# Patient Record
Sex: FEMALE | Race: BLACK OR AFRICAN AMERICAN | NOT HISPANIC OR LATINO | Employment: FULL TIME | URBAN - METROPOLITAN AREA
[De-identification: names, ages, dates, MRNs, and addresses within clinical notes are randomized per-mention and may not be internally consistent; named-entity substitution may affect disease eponyms.]

---

## 2018-07-25 ENCOUNTER — HOSPITAL ENCOUNTER (EMERGENCY)
Facility: HOSPITAL | Age: 58
Discharge: HOME | End: 2018-07-25
Attending: EMERGENCY MEDICINE | Admitting: EMERGENCY MEDICINE
Payer: COMMERCIAL

## 2018-07-25 VITALS
TEMPERATURE: 98.1 F | OXYGEN SATURATION: 98 % | RESPIRATION RATE: 16 BRPM | DIASTOLIC BLOOD PRESSURE: 65 MMHG | SYSTOLIC BLOOD PRESSURE: 145 MMHG | HEART RATE: 85 BPM

## 2018-07-25 DIAGNOSIS — R73.9 HYPERGLYCEMIA: Primary | ICD-10-CM

## 2018-07-25 LAB
ALBUMIN SERPL BCP-MCNC: 3.4 G/DL (ref 3.5–5)
ALP SERPL-CCNC: 170 U/L (ref 46–116)
ALT SERPL W P-5'-P-CCNC: 22 U/L (ref 12–78)
ANION GAP SERPL CALCULATED.3IONS-SCNC: 9 MMOL/L (ref 4–13)
AST SERPL W P-5'-P-CCNC: 14 U/L (ref 5–45)
ATRIAL RATE: 83 BPM
BASOPHILS # BLD AUTO: 0.05 THOUSANDS/ΜL (ref 0–0.1)
BASOPHILS NFR BLD AUTO: 1 % (ref 0–1)
BILIRUB SERPL-MCNC: 0.3 MG/DL (ref 0.2–1)
BILIRUB UR QL STRIP: NEGATIVE
BUN SERPL-MCNC: 12 MG/DL (ref 5–25)
CALCIUM SERPL-MCNC: 9.3 MG/DL (ref 8.3–10.1)
CHLORIDE SERPL-SCNC: 95 MMOL/L (ref 100–108)
CLARITY UR: CLEAR
CO2 SERPL-SCNC: 27 MMOL/L (ref 21–32)
COLOR UR: YELLOW
CREAT SERPL-MCNC: 1.23 MG/DL (ref 0.6–1.3)
EOSINOPHIL # BLD AUTO: 0.17 THOUSAND/ΜL (ref 0–0.61)
EOSINOPHIL NFR BLD AUTO: 2 % (ref 0–6)
ERYTHROCYTE [DISTWIDTH] IN BLOOD BY AUTOMATED COUNT: 12.9 % (ref 11.6–15.1)
GFR SERPL CREATININE-BSD FRML MDRD: 57 ML/MIN/1.73SQ M
GLUCOSE SERPL-MCNC: 146 MG/DL (ref 65–140)
GLUCOSE SERPL-MCNC: 447 MG/DL (ref 65–140)
GLUCOSE UR STRIP-MCNC: ABNORMAL MG/DL
HCT VFR BLD AUTO: 42.2 % (ref 34.8–46.1)
HGB BLD-MCNC: 14.7 G/DL (ref 11.5–15.4)
HGB UR QL STRIP.AUTO: NEGATIVE
KETONES UR STRIP-MCNC: ABNORMAL MG/DL
LEUKOCYTE ESTERASE UR QL STRIP: NEGATIVE
LYMPHOCYTES # BLD AUTO: 2.98 THOUSANDS/ΜL (ref 0.6–4.47)
LYMPHOCYTES NFR BLD AUTO: 33 % (ref 14–44)
MCH RBC QN AUTO: 31.7 PG (ref 26.8–34.3)
MCHC RBC AUTO-ENTMCNC: 34.8 G/DL (ref 31.4–37.4)
MCV RBC AUTO: 91 FL (ref 82–98)
MONOCYTES # BLD AUTO: 0.48 THOUSAND/ΜL (ref 0.17–1.22)
MONOCYTES NFR BLD AUTO: 5 % (ref 4–12)
NEUTROPHILS # BLD AUTO: 5.26 THOUSANDS/ΜL (ref 1.85–7.62)
NEUTS SEG NFR BLD AUTO: 59 % (ref 43–75)
NITRITE UR QL STRIP: NEGATIVE
NRBC BLD AUTO-RTO: 0 /100 WBCS
P AXIS: 59 DEGREES
PH UR STRIP.AUTO: 5 [PH] (ref 4.5–8)
PLATELET # BLD AUTO: 346 THOUSANDS/UL (ref 149–390)
PMV BLD AUTO: 10.1 FL (ref 8.9–12.7)
POTASSIUM SERPL-SCNC: 3.9 MMOL/L (ref 3.5–5.3)
PR INTERVAL: 190 MS
PROT SERPL-MCNC: 8.4 G/DL (ref 6.4–8.2)
PROT UR STRIP-MCNC: NEGATIVE MG/DL
QRS AXIS: 16 DEGREES
QRSD INTERVAL: 78 MS
QT INTERVAL: 358 MS
QTC INTERVAL: 420 MS
RBC # BLD AUTO: 4.63 MILLION/UL (ref 3.81–5.12)
SODIUM SERPL-SCNC: 131 MMOL/L (ref 136–145)
SP GR UR STRIP.AUTO: <=1.005 (ref 1–1.03)
T WAVE AXIS: 41 DEGREES
UROBILINOGEN UR QL STRIP.AUTO: 0.2 E.U./DL
VENTRICULAR RATE: 83 BPM
WBC # BLD AUTO: 8.94 THOUSAND/UL (ref 4.31–10.16)

## 2018-07-25 PROCEDURE — 82948 REAGENT STRIP/BLOOD GLUCOSE: CPT

## 2018-07-25 PROCEDURE — 93010 ELECTROCARDIOGRAM REPORT: CPT | Performed by: INTERNAL MEDICINE

## 2018-07-25 PROCEDURE — 80053 COMPREHEN METABOLIC PANEL: CPT | Performed by: EMERGENCY MEDICINE

## 2018-07-25 PROCEDURE — 99285 EMERGENCY DEPT VISIT HI MDM: CPT

## 2018-07-25 PROCEDURE — 96361 HYDRATE IV INFUSION ADD-ON: CPT

## 2018-07-25 PROCEDURE — 36415 COLL VENOUS BLD VENIPUNCTURE: CPT | Performed by: EMERGENCY MEDICINE

## 2018-07-25 PROCEDURE — 85025 COMPLETE CBC W/AUTO DIFF WBC: CPT | Performed by: EMERGENCY MEDICINE

## 2018-07-25 PROCEDURE — 81003 URINALYSIS AUTO W/O SCOPE: CPT

## 2018-07-25 PROCEDURE — 93005 ELECTROCARDIOGRAM TRACING: CPT

## 2018-07-25 PROCEDURE — 96375 TX/PRO/DX INJ NEW DRUG ADDON: CPT

## 2018-07-25 PROCEDURE — 96374 THER/PROPH/DIAG INJ IV PUSH: CPT

## 2018-07-25 RX ORDER — LOSARTAN POTASSIUM 100 MG/1
100 TABLET ORAL DAILY
COMMUNITY

## 2018-07-25 RX ORDER — METOCLOPRAMIDE HYDROCHLORIDE 5 MG/ML
10 INJECTION INTRAMUSCULAR; INTRAVENOUS ONCE
Status: COMPLETED | OUTPATIENT
Start: 2018-07-25 | End: 2018-07-25

## 2018-07-25 RX ORDER — AMLODIPINE BESYLATE 10 MG/1
10 TABLET ORAL DAILY
COMMUNITY

## 2018-07-25 RX ADMIN — METOCLOPRAMIDE 10 MG: 5 INJECTION, SOLUTION INTRAMUSCULAR; INTRAVENOUS at 16:14

## 2018-07-25 RX ADMIN — SODIUM CHLORIDE 1000 ML: 0.9 INJECTION, SOLUTION INTRAVENOUS at 15:45

## 2018-07-25 RX ADMIN — INSULIN HUMAN 14 UNITS: 100 INJECTION, SOLUTION PARENTERAL at 16:20

## 2018-07-25 NOTE — DISCHARGE INSTRUCTIONS
Diabetic Hyperglycemia   WHAT YOU NEED TO KNOW:   Diabetic hyperglycemia is a blood glucose (sugar) level that is higher than your healthcare provider recommends  You may have increased thirst and urinate more often than usual  Over time, uncontrolled diabetes can damage your nerves, blood vessels, tissues, and organs  That is why it is important to manage diabetic hyperglycemia  Without treatment, diabetic hyperglycemia can lead to diabetic ketoacidosis (DKA) or hyperglycemic hyperosmolar state (HHS)  These are serious conditions that can become life-threatening  DISCHARGE INSTRUCTIONS:   Return to the emergency department if:   · You have shortness of breath  · Your breath smells fruity  · You have nausea and vomiting  · You have symptoms of dehydration, such as dark yellow urine, dry mouth and lips, and dry skin  Contact your healthcare provider if:   · You continue to have higher blood sugar levels than your healthcare provider recommends  · Your blood sugar level is over 240 mg/dl and  you have ketones in your urine  · You have questions or concerns about your condition or care  Medicines:   · Medicines  such as insulin and hypoglycemic medicine decrease blood sugar levels  · Take your medicine as directed  Contact your healthcare provider if you think your medicine is not helping or if you have side effects  Tell him or her if you are allergic to any medicine  Keep a list of the medicines, vitamins, and herbs you take  Include the amounts, and when and why you take them  Bring the list or the pill bottles to follow-up visits  Carry your medicine list with you in case of an emergency  Follow up with your healthcare provider or specialist as directed: Your healthcare provider may refer you to a dietitian or diabetes specialist  Write down your questions so you remember to ask them during your visits     Manage diabetic hyperglycemia:   · If you take diabetes medicine or insulin, take it as directed  Missed or wrong doses can cause your blood sugar to go up  · Tell your healthcare provider if you continue to have trouble managing your blood sugar  He may change the type, amount, or timing of your diabetes medicine or insulin  If you do not take diabetes medicine or insulin, you may need to start  · Work with your healthcare provider to develop a sick day plan  Illness can cause your blood sugar to rise  A sick day plan helps you control your blood sugar level when you are sick  Prevent diabetic hyperglycemia:   · Check your blood sugar levels regularly  Ask your healthcare provider how often to check your blood sugar and what your levels should be  · Follow your meal plan  Your blood sugar can go up if you eat a large meal or you eat more carbohydrates than recommended  Work with a dietitian to develop a meal plan that is right for you  · Exercise regularly  to help lower your blood sugar when it is high  It can also keep your blood sugar levels steady over time  Exercise for at least 30 minutes, 5 days a week  Include muscle strengthening activities 2 days each week  Do not sit for longer than 90 minutes at a time  Work with your healthcare provider to create an exercise plan  Children should get at least 60 minutes of physical activity each day  · Check your ketones before exercise  if your blood sugar level is above 240 mg/dl  Do not exercise if you have ketones in your urine,  because your blood sugar level may rise even more  Ask your healthcare provider how to lower your blood sugar when you have ketones  © 2017 [address] [employer] Information is for End User's use only and may not be sold, redistributed or otherwise used for commercial purposes  All illustrations and images included in CareNotes® are the copyrighted property of [employer]  or Nikolas French  The above information is an  only   It is not intended as medical advice for individual conditions or treatments  Talk to your doctor, nurse or pharmacist before following any medical regimen to see if it is safe and effective for you

## 2018-07-25 NOTE — ED PROCEDURE NOTE
PROCEDURE  ECG 12 Lead Documentation  Date/Time: 7/25/2018 3:46 PM  Performed by: Rosalie Marquez  Authorized by: Rosalie Marquez     Indications / Diagnosis:  Dizzy  ECG reviewed by me, the ED Provider: yes    Patient location:  ED  Interpretation:     Interpretation: normal    Rate:     ECG rate assessment: normal    Rhythm:     Rhythm: sinus rhythm    Ectopy:     Ectopy: none    QRS:     QRS axis:  Normal  Conduction:     Conduction: normal    ST segments:     ST segments:  Normal  T waves:     T waves: normal           Xochilt Dorantes DO  07/25/18 1547

## 2018-07-25 NOTE — ED PROVIDER NOTES
History  Chief Complaint   Patient presents with    Hyperglycemia - Symptomatic     pt has been off insulin for a couple weeks due to insurance coverage, called PCP today was told BS >600 to come to ED for eval  pt c/o nausea, feeling thirsty and confused  52-year-old female with a history insulin-dependent diabetes for the last 30 years presents to the emergency department with symptomatic hyperglycemia  Patient states that she ran out of her basaglar on July 17th and since then her blood sugars have been steadily increasing running in the 3 to 400's  Today she felt very weak, nauseous with a dry mouth  She checked her blood sugar and it stated greater than 600  She called her family physician and was told to come to the emergency department  She did work out the problem with her insurance and is getting her medications  History provided by:  Patient   used: No    Hyperglycemia - Symptomatic   Blood sugar level PTA:  Greater than 600  Severity:  Unable to specify  Onset quality:  Gradual  Duration:  1 day  Chronicity:  New  Diabetes status:  Controlled with insulin  Context comment:  Ran out of 1 of her medications for diabetes  Associated symptoms: increased thirst, malaise, nausea and weakness    Associated symptoms: no abdominal pain, no altered mental status, no blurred vision, no chest pain, no confusion, no dehydration, no diaphoresis, no dizziness, no dysuria, no fatigue, no fever, no increased appetite, no polyuria, no shortness of breath, no syncope and no vomiting    Risk factors: obesity    Risk factors: no hx of DKA        Prior to Admission Medications   Prescriptions Last Dose Informant Patient Reported? Taking?    Dapagliflozin-Metformin HCl ER (XIGDUO XR)  MG TB24 7/25/2018 at Unknown time  Yes Yes   Sig: Take by mouth   amLODIPine (NORVASC) 10 mg tablet Past Month at Unknown time  Yes Yes   Sig: Take 10 mg by mouth daily   insulin lispro protamine-insulin lispro (HUMALOG MIX 75/25) 100 units/mL Past Month at Unknown time  Yes Yes   Sig: inject by subcutaneous route as per insulin sliding scale protocol   losartan (COZAAR) 100 MG tablet Past Month at Unknown time  Yes Yes   Sig: Take 100 mg by mouth daily   traMADol (ULTRAM) 50 mg tablet 2018 at Unknown time  No Yes   Si-2 tablets by mouth every 6 hours as needed for pain      Facility-Administered Medications: None       Past Medical History:   Diagnosis Date    Diabetes mellitus ([facility] [address] )     Fibromyalgia     Hypertension        Past Surgical History:   Procedure Laterality Date     SECTION      HYSTERECTOMY         History reviewed  No pertinent family history  I have reviewed and agree with the history as documented  Social History   Substance Use Topics    Smoking status: Never Smoker    Smokeless tobacco: Never Used    Alcohol use No        Review of Systems   Constitutional: Negative for chills, diaphoresis, fatigue and fever  HENT: Negative  Negative for congestion, rhinorrhea and sore throat  Eyes: Negative  Negative for blurred vision, discharge, redness and itching  Respiratory: Negative  Negative for apnea, cough, chest tightness, shortness of breath and wheezing  Cardiovascular: Negative for chest pain, palpitations, leg swelling and syncope  Gastrointestinal: Positive for nausea  Negative for abdominal pain and vomiting  Endocrine: Positive for polydipsia  Negative for polyuria  Genitourinary: Negative  Negative for dysuria, flank pain, frequency and urgency  Musculoskeletal: Negative  Negative for back pain  Skin: Negative  Allergic/Immunologic: Negative  Neurological: Positive for weakness  Negative for dizziness, syncope, light-headedness, numbness and headaches  Hematological: Negative  Psychiatric/Behavioral: Negative for confusion  All other systems reviewed and are negative        Physical Exam  Physical Exam Constitutional: She is oriented to person, place, and time  She appears well-developed and well-nourished  Non-toxic appearance  She does not have a sickly appearance  She does not appear ill  No distress  HENT:   Head: Normocephalic and atraumatic  Right Ear: External ear normal    Left Ear: External ear normal    Mouth/Throat: Oropharynx is clear and moist  No oropharyngeal exudate  Eyes: Conjunctivae are normal  Pupils are equal, round, and reactive to light  Right eye exhibits no discharge  Left eye exhibits no discharge  No scleral icterus  Cardiovascular: Normal rate, regular rhythm and normal heart sounds  Exam reveals no gallop and no friction rub  No murmur heard  Pulmonary/Chest: Effort normal and breath sounds normal  No respiratory distress  She has no wheezes  She has no rales  She exhibits no tenderness  Abdominal: Soft  Bowel sounds are normal  She exhibits no distension and no mass  There is no tenderness  No hernia  Musculoskeletal: Normal range of motion  She exhibits no edema, tenderness or deformity  Neurological: She is alert and oriented to person, place, and time  She has normal reflexes  She displays normal reflexes  She exhibits normal muscle tone  Skin: Skin is warm and dry  No rash noted  She is not diaphoretic  No erythema  No pallor  Psychiatric: She has a normal mood and affect  Nursing note and vitals reviewed        Vital Signs  ED Triage Vitals [07/25/18 1514]   Temperature Pulse Respirations Blood Pressure SpO2   98 1 °F (36 7 °C) 89 20 134/94 96 %      Temp Source Heart Rate Source Patient Position - Orthostatic VS BP Location FiO2 (%)   Oral Monitor Sitting Left arm --      Pain Score       7           Vitals:    07/25/18 1514 07/25/18 1535 07/25/18 1615 07/25/18 1642   BP: 134/94 140/71  143/77   Pulse: 89 86 80 90   Patient Position - Orthostatic VS: Sitting Lying  Lying       Visual Acuity  Visual Acuity      Most Recent Value   L Pupil Size (mm)  4 R Pupil Size (mm)  4          ED Medications  Medications   sodium chloride 0 9 % bolus 1,000 mL (0 mL Intravenous Stopped 7/25/18 1728)   metoclopramide (REGLAN) injection 10 mg (10 mg Intravenous Given 7/25/18 1614)   insulin regular (HumuLIN R,NovoLIN R) injection 14 Units (14 Units Intravenous Given 7/25/18 1620)       Diagnostic Studies  Results Reviewed     Procedure Component Value Units Date/Time    POCT urinalysis dipstick [[id number]]  (Abnormal) Resulted:  07/25/18 1729    Lab Status:  Final result Specimen:  Urine Updated:  07/25/18 1729    Fingerstick Glucose (POCT) [[id number]]  (Abnormal) Collected:  07/25/18 1726    Lab Status:  Final result Updated:  07/25/18 1727     POC Glucose 146 (H) mg/dl     ED Urine Macroscopic [[id number]]  (Abnormal) Collected:  07/25/18 1647    Lab Status:  Final result Specimen:  Urine Updated:  07/25/18 1641     Color, UA Yellow     Clarity, UA Clear     pH, UA 5 0     Leukocytes, UA Negative     Nitrite, UA Negative     Protein, UA Negative mg/dl      Glucose,  (1/2%) (A) mg/dl      Ketones, UA Trace (A) mg/dl      Urobilinogen, UA 0 2 E U /dl      Bilirubin, UA Negative     Blood, UA Negative     Specific Gravity, UA <=1 005    Narrative:       CLINITEK RESULT    Comprehensive metabolic panel [[id number]]  (Abnormal) Collected:  07/25/18 1539    Lab Status:  Final result Specimen:  Blood from Arm, Right Updated:  07/25/18 1608     Sodium 131 (L) mmol/L      Potassium 3 9 mmol/L      Chloride 95 (L) mmol/L      CO2 27 mmol/L      Anion Gap 9 mmol/L      BUN 12 mg/dL      Creatinine 1 23 mg/dL      Glucose 447 (H) mg/dL      Calcium 9 3 mg/dL      AST 14 U/L      ALT 22 U/L      Alkaline Phosphatase 170 (H) U/L      Total Protein 8 4 (H) g/dL      Albumin 3 4 (L) g/dL      Total Bilirubin 0 30 mg/dL      eGFR 57 ml/min/1 73sq m     Narrative:         National Kidney Disease Education Program recommendations are as follows:  GFR calculation is accurate only with a steady state creatinine  Chronic Kidney disease less than 60 ml/min/1 73 sq  meters  Kidney failure less than 15 ml/min/1 73 sq  meters  CBC and differential [[id number]] Collected:  07/25/18 1539    Lab Status:  Final result Specimen:  Blood from Arm, Right Updated:  07/25/18 1544     WBC 8 94 Thousand/uL      RBC 4 63 Million/uL      Hemoglobin 14 7 g/dL      Hematocrit 42 2 %      MCV 91 fL      MCH 31 7 pg      MCHC 34 8 g/dL      RDW 12 9 %      MPV 10 1 fL      Platelets 667 Thousands/uL      nRBC 0 /100 WBCs      Neutrophils Relative 59 %      Lymphocytes Relative 33 %      Monocytes Relative 5 %      Eosinophils Relative 2 %      Basophils Relative 1 %      Neutrophils Absolute 5 26 Thousands/µL      Lymphocytes Absolute 2 98 Thousands/µL      Monocytes Absolute 0 48 Thousand/µL      Eosinophils Absolute 0 17 Thousand/µL      Basophils Absolute 0 05 Thousands/µL                  No orders to display              Procedures  Procedures       Phone Contacts  ED Phone Contact    ED Course  ED Course as of Jul 25 1814 Wed Jul 25, 2018   1716 Patient starting to feel better  She was told her blood sugar was 447  Will recheck 1 hour after insulin dose  She will be stable for discharge given that she is not acidotic and does have a prescription waiting for her for her insulin                                MDM  Number of Diagnoses or Management Options  Diagnosis management comments: 52-year-old female with a history of diabetes presents with symptomatic hyperglycemia  She ran out of 1 of her insulins secondary to insurance issues on July 17th and since then her blood sugar has been increasing  On exam she does not appear acutely ill and her neurologic exam here is completely normal   Will check labs including blood sugar  Will give IV fluids    Patient did state that her medication will be refilled       Amount and/or Complexity of Data Reviewed  Clinical lab tests: ordered and reviewed  Independent visualization of images, tracings, or specimens: yes      CritCare Time    Disposition  Final diagnoses:   Hyperglycemia     Time reflects when diagnosis was documented in both MDM as applicable and the Disposition within this note     Time User Action Codes Description Comment    7/25/2018  6:14 PM Abimael QUILES Add [R73 9] Hyperglycemia       ED Disposition     ED Disposition Condition Comment    Discharge  Geetha Guzman discharge to home/self care  Condition at discharge: Good        Follow-up Information     Follow up With Specialties Details [address] [facility], Nurse Practitioner Schedule an appointment as soon as possible for a visit in 2 days  [address]  [phone]            Patient's Medications   Discharge Prescriptions    No medications on file     No discharge procedures on file      ED Provider  Electronically Signed by           Carlota Singletary DO  07/25/18 4132

## 2019-01-16 ENCOUNTER — HOSPITAL ENCOUNTER (OUTPATIENT)
Dept: RADIOLOGY | Facility: HOSPITAL | Age: 59
Discharge: HOME | End: 2019-01-16

## 2019-01-16 ENCOUNTER — TRANSCRIBE ORDERS (OUTPATIENT)
Dept: ADMINISTRATIVE | Facility: HOSPITAL | Age: 59
End: 2019-01-16

## 2019-01-16 DIAGNOSIS — R74.8 ACID PHOSPHATASE ELEVATED: Primary | ICD-10-CM

## 2019-01-16 DIAGNOSIS — R74.8 ACID PHOSPHATASE ELEVATED: ICD-10-CM

## 2019-07-08 ENCOUNTER — TRANSCRIBE ORDERS (OUTPATIENT)
Dept: ADMINISTRATIVE | Facility: HOSPITAL | Age: 59
End: 2019-07-08

## 2019-07-08 DIAGNOSIS — R74.8 ABNORMAL ALKALINE PHOSPHATASE TEST: Primary | ICD-10-CM

## 2019-07-15 ENCOUNTER — HOSPITAL ENCOUNTER (OUTPATIENT)
Dept: NUCLEAR MEDICINE | Facility: HOSPITAL | Age: 59
Discharge: HOME | End: 2019-07-15
Payer: COMMERCIAL

## 2019-07-15 DIAGNOSIS — R74.8 ABNORMAL ALKALINE PHOSPHATASE TEST: ICD-10-CM

## 2019-07-15 PROCEDURE — 78306 BONE IMAGING WHOLE BODY: CPT

## 2019-07-15 PROCEDURE — A9503 TC99M MEDRONATE: HCPCS

## 2020-02-20 ENCOUNTER — TRANSCRIBE ORDERS (OUTPATIENT)
Dept: ADMINISTRATIVE | Facility: HOSPITAL | Age: 60
End: 2020-02-20

## 2020-02-20 DIAGNOSIS — R74.8 ACID PHOSPHATASE ELEVATED: Primary | ICD-10-CM

## 2020-02-20 DIAGNOSIS — M79.7 SCAPULOHUMERAL FIBROSITIS: ICD-10-CM

## 2020-02-26 ENCOUNTER — HOSPITAL ENCOUNTER (OUTPATIENT)
Dept: RADIOLOGY | Facility: HOSPITAL | Age: 60
Discharge: HOME | End: 2020-02-26
Payer: COMMERCIAL

## 2020-02-26 DIAGNOSIS — M79.7 SCAPULOHUMERAL FIBROSITIS: ICD-10-CM

## 2020-02-26 DIAGNOSIS — R74.8 ACID PHOSPHATASE ELEVATED: ICD-10-CM

## 2020-02-26 PROCEDURE — 78306 BONE IMAGING WHOLE BODY: CPT

## 2020-02-26 PROCEDURE — A9503 TC99M MEDRONATE: HCPCS

## 2021-09-02 ENCOUNTER — APPOINTMENT (EMERGENCY)
Dept: RADIOLOGY | Facility: HOSPITAL | Age: 61
End: 2021-09-02
Payer: COMMERCIAL

## 2021-09-02 ENCOUNTER — HOSPITAL ENCOUNTER (OUTPATIENT)
Facility: HOSPITAL | Age: 61
Setting detail: OBSERVATION
Discharge: HOME | End: 2021-09-03
Attending: EMERGENCY MEDICINE | Admitting: HOSPITALIST
Payer: COMMERCIAL

## 2021-09-02 DIAGNOSIS — R07.9 CHEST PAIN: Primary | ICD-10-CM

## 2021-09-02 PROBLEM — I10 ESSENTIAL HYPERTENSION: Status: ACTIVE | Noted: 2021-09-02

## 2021-09-02 PROBLEM — E11.22 TYPE 2 DIABETES MELLITUS WITH CHRONIC KIDNEY DISEASE, WITHOUT LONG-TERM CURRENT USE OF INSULIN (HCC): Status: ACTIVE | Noted: 2021-09-02

## 2021-09-02 PROBLEM — N18.31 STAGE 3A CHRONIC KIDNEY DISEASE (HCC): Status: ACTIVE | Noted: 2021-09-02

## 2021-09-02 LAB
ANION GAP SERPL CALCULATED.3IONS-SCNC: 10 MMOL/L (ref 5–14)
BASOPHILS # BLD AUTO: 0.1 THOUSANDS/ΜL (ref 0–0.1)
BASOPHILS NFR BLD AUTO: 1 % (ref 0–1)
BUN SERPL-MCNC: 15 MG/DL (ref 5–25)
CALCIUM SERPL-MCNC: 9.7 MG/DL (ref 8.4–10.2)
CHLORIDE SERPL-SCNC: 100 MMOL/L (ref 97–108)
CO2 SERPL-SCNC: 27 MMOL/L (ref 22–30)
CREAT SERPL-MCNC: 1.28 MG/DL (ref 0.6–1.2)
EOSINOPHIL # BLD AUTO: 0.2 THOUSAND/ΜL (ref 0–0.4)
EOSINOPHIL NFR BLD AUTO: 2 % (ref 0–6)
ERYTHROCYTE [DISTWIDTH] IN BLOOD BY AUTOMATED COUNT: 14.5 %
EST. AVERAGE GLUCOSE BLD GHB EST-MCNC: 197 MG/DL
GFR SERPL CREATININE-BSD FRML MDRD: 53 ML/MIN/1.73SQ M
GLUCOSE SERPL-MCNC: 209 MG/DL (ref 65–140)
GLUCOSE SERPL-MCNC: 224 MG/DL (ref 65–140)
GLUCOSE SERPL-MCNC: 307 MG/DL (ref 70–99)
HBA1C MFR BLD: 8.5 %
HCT VFR BLD AUTO: 38 % (ref 36–46)
HGB BLD-MCNC: 12.5 G/DL (ref 12–16)
LIPASE SERPL-CCNC: 66 U/L (ref 23–300)
LYMPHOCYTES # BLD AUTO: 2.3 THOUSANDS/ΜL (ref 0.5–4)
LYMPHOCYTES NFR BLD AUTO: 30 % (ref 25–45)
MCH RBC QN AUTO: 30.7 PG (ref 26–34)
MCHC RBC AUTO-ENTMCNC: 32.9 G/DL (ref 31–36)
MCV RBC AUTO: 94 FL (ref 80–100)
MONOCYTES # BLD AUTO: 0.6 THOUSAND/ΜL (ref 0.2–0.9)
MONOCYTES NFR BLD AUTO: 8 % (ref 1–10)
NEUTROPHILS # BLD AUTO: 4.4 THOUSANDS/ΜL (ref 1.8–7.8)
NEUTS SEG NFR BLD AUTO: 59 % (ref 45–65)
NT-PROBNP SERPL-MCNC: 33.7 PG/ML (ref 0–299)
PLATELET # BLD AUTO: 448 THOUSANDS/UL (ref 150–450)
PMV BLD AUTO: 7 FL (ref 8.9–12.7)
POTASSIUM SERPL-SCNC: 4.7 MMOL/L (ref 3.6–5)
RBC # BLD AUTO: 4.06 MILLION/UL (ref 4–5.2)
SODIUM SERPL-SCNC: 137 MMOL/L (ref 137–147)
TROPONIN I SERPL-MCNC: <0.01 NG/ML (ref 0–0.03)
WBC # BLD AUTO: 7.6 THOUSAND/UL (ref 4.5–11)

## 2021-09-02 PROCEDURE — 82948 REAGENT STRIP/BLOOD GLUCOSE: CPT

## 2021-09-02 PROCEDURE — 96374 THER/PROPH/DIAG INJ IV PUSH: CPT

## 2021-09-02 PROCEDURE — 83690 ASSAY OF LIPASE: CPT | Performed by: EMERGENCY MEDICINE

## 2021-09-02 PROCEDURE — 83036 HEMOGLOBIN GLYCOSYLATED A1C: CPT | Performed by: HOSPITALIST

## 2021-09-02 PROCEDURE — 93005 ELECTROCARDIOGRAM TRACING: CPT

## 2021-09-02 PROCEDURE — 99285 EMERGENCY DEPT VISIT HI MDM: CPT | Performed by: EMERGENCY MEDICINE

## 2021-09-02 PROCEDURE — 85025 COMPLETE CBC W/AUTO DIFF WBC: CPT | Performed by: EMERGENCY MEDICINE

## 2021-09-02 PROCEDURE — 84484 ASSAY OF TROPONIN QUANT: CPT | Performed by: EMERGENCY MEDICINE

## 2021-09-02 PROCEDURE — 99285 EMERGENCY DEPT VISIT HI MDM: CPT

## 2021-09-02 PROCEDURE — 83880 ASSAY OF NATRIURETIC PEPTIDE: CPT | Performed by: EMERGENCY MEDICINE

## 2021-09-02 PROCEDURE — 36415 COLL VENOUS BLD VENIPUNCTURE: CPT | Performed by: EMERGENCY MEDICINE

## 2021-09-02 PROCEDURE — 84484 ASSAY OF TROPONIN QUANT: CPT | Performed by: HOSPITALIST

## 2021-09-02 PROCEDURE — 99220 PR INITIAL OBSERVATION CARE/DAY 70 MINUTES: CPT | Performed by: HOSPITALIST

## 2021-09-02 PROCEDURE — 80048 BASIC METABOLIC PNL TOTAL CA: CPT | Performed by: EMERGENCY MEDICINE

## 2021-09-02 PROCEDURE — 71045 X-RAY EXAM CHEST 1 VIEW: CPT

## 2021-09-02 RX ORDER — SODIUM CHLORIDE 9 MG/ML
3 INJECTION INTRAVENOUS
Status: DISCONTINUED | OUTPATIENT
Start: 2021-09-02 | End: 2021-09-03 | Stop reason: HOSPADM

## 2021-09-02 RX ORDER — ONDANSETRON 2 MG/ML
4 INJECTION INTRAMUSCULAR; INTRAVENOUS EVERY 4 HOURS PRN
Status: DISCONTINUED | OUTPATIENT
Start: 2021-09-02 | End: 2021-09-03 | Stop reason: HOSPADM

## 2021-09-02 RX ORDER — LOSARTAN POTASSIUM 50 MG/1
100 TABLET ORAL DAILY
Status: DISCONTINUED | OUTPATIENT
Start: 2021-09-03 | End: 2021-09-03 | Stop reason: HOSPADM

## 2021-09-02 RX ORDER — MORPHINE SULFATE 4 MG/ML
4 INJECTION, SOLUTION INTRAMUSCULAR; INTRAVENOUS ONCE
Status: COMPLETED | OUTPATIENT
Start: 2021-09-02 | End: 2021-09-02

## 2021-09-02 RX ORDER — ASPIRIN 81 MG/1
324 TABLET, CHEWABLE ORAL ONCE
Status: COMPLETED | OUTPATIENT
Start: 2021-09-02 | End: 2021-09-02

## 2021-09-02 RX ORDER — ACETAMINOPHEN 325 MG/1
650 TABLET ORAL EVERY 6 HOURS PRN
Status: DISCONTINUED | OUTPATIENT
Start: 2021-09-02 | End: 2021-09-03 | Stop reason: HOSPADM

## 2021-09-02 RX ORDER — AMLODIPINE BESYLATE 10 MG/1
10 TABLET ORAL DAILY
Status: DISCONTINUED | OUTPATIENT
Start: 2021-09-03 | End: 2021-09-03 | Stop reason: HOSPADM

## 2021-09-02 RX ADMIN — ACETAMINOPHEN 650 MG: 325 TABLET ORAL at 17:08

## 2021-09-02 RX ADMIN — INSULIN LISPRO 4 UNITS: 100 INJECTION, SOLUTION INTRAVENOUS; SUBCUTANEOUS at 17:35

## 2021-09-02 RX ADMIN — ASPIRIN 81 MG CHEWABLE TABLET 324 MG: 81 TABLET CHEWABLE at 15:13

## 2021-09-02 RX ADMIN — MORPHINE SULFATE 4 MG: 4 INJECTION INTRAVENOUS at 15:13

## 2021-09-02 RX ADMIN — ONDANSETRON 4 MG: 2 INJECTION INTRAMUSCULAR; INTRAVENOUS at 23:52

## 2021-09-02 NOTE — H&P
52 [facility]  H&P- Beto Sierra 1960, 62 y o  female MRN: [id number]  Unit/Bed#: 7T [facility] 709-01 Encounter: [id number]  Primary Care Provider: No primary care provider on file  Date and time admitted to hospital: 9/2/2021  1:44 PM    Type 2 diabetes mellitus with chronic kidney disease, without long-term current use of insulin ([facility] )  Assessment & Plan  Lab Results   Component Value Date    HGBA1C 9 8 (H) 04/21/2021       No results for input(s): POCGLU in the last 72 hours  Blood Sugar Average: Last 72 hrs:     -cont with SSI while in the hospital  -resume home antihyperglycemic agents on d/c    Essential hypertension  Assessment & Plan  -cont Norvasc/Cozaar    Stage 3a chronic kidney disease ([facility] [address] )  Assessment & Plan  Lab Results   Component Value Date    EGFR 53 (L) 09/02/2021    EGFR 57 07/25/2018    EGFR >60 0 08/24/2016    CREATININE 1 28 (H) 09/02/2021    CREATININE 1 23 07/25/2018    CREATININE 0 99 08/24/2016     -baseline Cr 1 2    * Chest pain  Assessment & Plan  -likely non-cardiac  -ECG (read by me): NSR 93, nl axis/intervals, no acute ST/TW changes  -first trop NEG, cont to trend  -monitor on tele  -c/s cards      VTE Prophylaxis: low risk, ambulation  Code Status: FULL  POLST: POLST is not applicable to this patient  Discussion with family:  at bedside    Anticipated Length of Stay:  Patient will be admitted on an Observation basis with an anticipated length of stay of  < 2 midnights  Justification for Hospital Stay: chest pain    Total Time for Visit, including Counseling / Coordination of Care: 45 minutes  Greater than 50% of this total time spent on direct patient counseling and coordination of care  Chief Complaint:   Chest pain    History of Present Illness:    Beto Sierra is a 62 y o  female who presents with a chief complaint of chest pain    The patient reports around 1130 this morning she noticed sharp retrosternal chest pain that radiated into her right chest   She describes it as a fork stabbing me in tearing me open    She says this was associated with shortness of breath  At that time her pain was 11/10, after IV morphine given in the ER it is now 4 5/10  Patient does report that with her fibromyalgia she has had similar chest pain in the past but it has never been this severe  She reports some mild associated nausea and lightheadedness  She denies any abdominal pain, diarrhea  Denies any focal neurological deficits, rash, dysuria, headache, visual disturbance, neck stiffness  Review of Systems:    Review of Systems   All other systems reviewed and are negative  Past Medical and Surgical History:     Past Medical History:   Diagnosis Date    Diabetes mellitus ([facility] [address] )     Fibromyalgia     Hypertension        Past Surgical History:   Procedure Laterality Date     SECTION      HYSTERECTOMY         Meds/Allergies:    Prior to Admission medications    Medication Sig Start Date End Date Taking? Authorizing Provider   amLODIPine (NORVASC) 10 mg tablet Take 10 mg by mouth daily   Yes Historical Provider, MD   Dapagliflozin-Metformin HCl ER (XIGDUO XR)  MG TB24 Take by mouth   Yes Historical Provider, MD   insulin lispro protamine-insulin lispro (HUMALOG MIX 75/25) 100 units/mL inject by subcutaneous route as per insulin sliding scale protocol 14  Yes Historical Provider, MD   losartan (COZAAR) 100 MG tablet Take 100 mg by mouth daily   Yes Historical Provider, MD   traMADol Klausobie Garcia) 50 mg tablet 1-2 tablets by mouth every 6 hours as needed for pain 16   Val Beckman MD     I have reviewed home medications with a medical source (PCP, Pharmacy, other)  Allergies:    Allergies   Allergen Reactions    Other Shortness Of Breath    Demerol [Meperidine]        Social History:     Marital Status: /Civil Union   Substance Use History:   Social History     Substance and Sexual Activity   Alcohol Use No Social History     Tobacco Use   Smoking Status Never Smoker   Smokeless Tobacco Never Used     Social History     Substance and Sexual Activity   Drug Use No       Family History:    non-contributory    Physical Exam:     Vitals:   Blood Pressure: (!) 182/96 (09/02/21 1559)  Pulse: 86 (09/02/21 1559)  Temperature: (!) 96 3 °F (35 7 °C) (09/02/21 1559)  Temp Source: Temporal (09/02/21 1559)  Respirations: 18 (09/02/21 1559)  Weight - Scale: 112 kg (247 lb) (09/02/21 1353)  SpO2: 96 % (09/02/21 1559)    Physical Exam    Gen: NAD, AAOx3, well developed, well nourished  Eyes: EOMI, PERRLA, no scleral icterus  ENMT:  Oropharynx clear of erythema or exudates, no nasal discharge, no otic discharge, moist mucous membranes  Neck:  Supple  Lymph:  No anterior or posterior cervical or supraclavicular lymphadenopathy  Cardiovascular:  Regular rate and rhythm, normal S1-S2, no murmurs, rubs, or gallops  Lungs:  Clear to auscultation bilaterally, no wheezes, or rales, or rhonchi  Abdomen:  Positive bowel sounds, soft, nontender, nondistended, no palpable organomegaly   Skin:  Intact, no obvious lesions or rashes, no edema  Neuro: Cranial nerves 2-12 are intact, non-focal, 5/5 strength in all 4 extremities      Additional Data:     Lab Results: I have personally reviewed pertinent reports  Results from last 7 days   Lab Units 09/02/21  1410   WBC Thousand/uL 7 60   HEMOGLOBIN g/dL 12 5   HEMATOCRIT % 38 0   PLATELETS Thousands/uL 448   NEUTROS PCT % 59   LYMPHS PCT % 30   MONOS PCT % 8   EOS PCT % 2     Results from last 7 days   Lab Units 09/02/21  1410   SODIUM mmol/L 137   POTASSIUM mmol/L 4 7   CHLORIDE mmol/L 100   CO2 mmol/L 27   BUN mg/dL 15   CREATININE mg/dL 1 28*   ANION GAP mmol/L 10   CALCIUM mg/dL 9 7   GLUCOSE RANDOM mg/dL 307*                       Imaging: I have personally reviewed pertinent reports  X-ray chest 1 view portable    (Results Pending)       [facility] [employer] Records Reviewed:  Yes ** Please Note: This note has been constructed using a voice recognition system   **

## 2021-09-02 NOTE — ASSESSMENT & PLAN NOTE
-likely non-cardiac  -ECG (read by me): NSR 93, nl axis/intervals, no acute ST/TW changes  -first trop NEG, cont to trend  -monitor on tele  -c/s cards

## 2021-09-02 NOTE — ED NOTES
Patient okay to go to floor in 10 min      Kathleen Keane, [facility]  09/02/21 8764

## 2021-09-02 NOTE — ED PROVIDER NOTES
History  Chief Complaint   Patient presents with    Chest Pain     pt c/o "sternal" chest pain that started about an hour ago w/ radiation to the right        History provided by:  Patient  Chest Pain  Pain location:  Substernal area  Pain quality: aching and burning    Pain radiates to:  Does not radiate  Pain severity:  Moderate  Onset quality:  Gradual  Timing:  Constant  Progression:  Worsening  Chronicity:  New  Associated symptoms: no abdominal pain, no cough, no dizziness, no dysphagia, no fever, no headache, no nausea, no numbness, no shortness of breath and no weakness        Prior to Admission Medications   Prescriptions Last Dose Informant Patient Reported? Taking? Dapagliflozin-Metformin HCl ER (XIGDUO XR)  MG  at Unknown time  Yes Yes   Sig: Take by mouth   amLODIPine (NORVASC) 10 mg tablet 2021 at Unknown time  Yes Yes   Sig: Take 10 mg by mouth daily   insulin lispro protamine-insulin lispro (HUMALOG MIX 75/25) 100 units/mL 2021 at Unknown time  Yes Yes   Sig: inject by subcutaneous route as per insulin sliding scale protocol   losartan (COZAAR) 100 MG tablet 2021 at Unknown time  Yes Yes   Sig: Take 100 mg by mouth daily   traMADol (ULTRAM) 50 mg tablet   No No   Si-2 tablets by mouth every 6 hours as needed for pain      Facility-Administered Medications: None       Past Medical History:   Diagnosis Date    Diabetes mellitus ([facility] [address] )     Fibromyalgia     Hypertension        Past Surgical History:   Procedure Laterality Date     SECTION      HYSTERECTOMY         History reviewed  No pertinent family history  I have reviewed and agree with the history as documented      E-Cigarette/Vaping     E-Cigarette/Vaping Substances     Social History     Tobacco Use    Smoking status: Never Smoker    Smokeless tobacco: Never Used   Substance Use Topics    Alcohol use: Never    Drug use: No       Review of Systems   Constitutional: Negative for chills and fever    HENT: Negative for rhinorrhea, sore throat and trouble swallowing  Eyes: Negative for pain  Respiratory: Negative for cough, shortness of breath, wheezing and stridor  Cardiovascular: Positive for chest pain  Negative for leg swelling  Gastrointestinal: Negative for abdominal pain, diarrhea and nausea  Endocrine: Negative for polyuria  Genitourinary: Negative for dysuria, flank pain and urgency  Musculoskeletal: Negative for joint swelling, myalgias and neck stiffness  Skin: Negative for rash  Allergic/Immunologic: Negative for immunocompromised state  Neurological: Negative for dizziness, syncope, weakness, numbness and headaches  Psychiatric/Behavioral: Negative for confusion and suicidal ideas  All other systems reviewed and are negative  Physical Exam  Physical Exam  Vitals and nursing note reviewed  Constitutional:       Appearance: Normal appearance  She is well-developed  HENT:      Head: Normocephalic and atraumatic  Nose: Nose normal       Mouth/Throat:      Mouth: Mucous membranes are moist    Eyes:      Extraocular Movements: Extraocular movements intact  Pupils: Pupils are equal, round, and reactive to light  Cardiovascular:      Rate and Rhythm: Normal rate and regular rhythm  Heart sounds: No murmur heard  No friction rub  Pulmonary:      Effort: No respiratory distress  Breath sounds: Normal breath sounds  No wheezing or rales  Chest:      Chest wall: Tenderness present  Abdominal:      General: Bowel sounds are normal  There is no distension  Palpations: Abdomen is soft  Tenderness: There is no abdominal tenderness  Musculoskeletal:         General: No tenderness  Normal range of motion  Cervical back: Normal range of motion and neck supple  Skin:     General: Skin is warm  Findings: No rash  Neurological:      Mental Status: She is alert and oriented to person, place, and time     Psychiatric: Mood and Affect: Mood normal          Vital Signs  ED Triage Vitals   Temperature Pulse Respirations Blood Pressure SpO2   09/02/21 1559 09/02/21 1353 09/02/21 1353 09/02/21 1353 09/02/21 1353   (!) 96 3 °F (35 7 °C) 101 18 125/67 96 %      Temp Source Heart Rate Source Patient Position - Orthostatic VS BP Location FiO2 (%)   09/02/21 1559 09/02/21 1353 09/02/21 1353 09/02/21 1353 --   Temporal Monitor Sitting Left arm       Pain Score       09/02/21 1513       Worst Possible Pain           Vitals:    09/02/21 1353 09/02/21 1430 09/02/21 1500 09/02/21 1559   BP: 125/67 131/74 151/81 (!) 182/96   Pulse: 101 86 88 86   Patient Position - Orthostatic VS: Sitting Lying Lying Lying         Visual Acuity      ED Medications  Medications   sodium chloride (PF) 0 9 % injection 3 mL (has no administration in time range)   amLODIPine (NORVASC) tablet 10 mg (has no administration in time range)   losartan (COZAAR) tablet 100 mg (has no administration in time range)   acetaminophen (TYLENOL) tablet 650 mg (650 mg Oral Given 9/2/21 1708)   insulin lispro (HumaLOG) 100 units/mL subcutaneous injection 2-12 Units (has no administration in time range)   aspirin chewable tablet 324 mg (324 mg Oral Given 9/2/21 1513)   morphine (PF) 4 mg/mL injection 4 mg (4 mg Intravenous Given 9/2/21 1513)       Diagnostic Studies  Results Reviewed     Procedure Component Value Units Date/Time    Troponin I [[id number]]  (Normal) Collected: 09/02/21 1410    Lab Status: Final result Specimen: Blood from Arm, Right Updated: 09/02/21 1459     Troponin I <0 01 ng/mL     NT-BNP PRO [[id number]]  (Normal) Collected: 09/02/21 1410    Lab Status: Final result Specimen: Blood from Arm, Right Updated: 09/02/21 1452     NT-proBNP 33 7 pg/mL     Lipase [[id number]]  (Normal) Collected: 09/02/21 1410    Lab Status: Final result Specimen: Blood from Arm, Right Updated: 09/02/21 1445     Lipase 66 u/L     Basic metabolic panel [[id number]]  (Abnormal) Collected: 09/02/21 1410    Lab Status: Final result Specimen: Blood from Arm, Right Updated: 09/02/21 1445     Sodium 137 mmol/L      Potassium 4 7 mmol/L      Chloride 100 mmol/L      CO2 27 mmol/L      ANION GAP 10 mmol/L      BUN 15 mg/dL      Creatinine 1 28 mg/dL      Glucose 307 mg/dL      Calcium 9 7 mg/dL      eGFR 53 ml/min/1 73sq m     Narrative:      [place] guidelines for Chronic Kidney Disease (CKD):     Stage 1 with normal or high GFR (GFR > 90 mL/min/1 73 square meters)    Stage 2 Mild CKD (GFR = 60-89 mL/min/1 73 square meters)    Stage 3A Moderate CKD (GFR = 45-59 mL/min/1 73 square meters)    Stage 3B Moderate CKD (GFR = 30-44 mL/min/1 73 square meters)    Stage 4 Severe CKD (GFR = 15-29 mL/min/1 73 square meters)    Stage 5 End Stage CKD (GFR <15 mL/min/1 73 square meters)  Note: GFR calculation is accurate only with a steady state creatinine    CBC and differential [[id number]]  (Abnormal) Collected: 09/02/21 1410    Lab Status: Final result Specimen: Blood from Arm, Right Updated: 09/02/21 1436     WBC 7 60 Thousand/uL      RBC 4 06 Million/uL      Hemoglobin 12 5 g/dL      Hematocrit 38 0 %      MCV 94 fL      MCH 30 7 pg      MCHC 32 9 g/dL      RDW 14 5 %      MPV 7 0 fL      Platelets 242 Thousands/uL      Neutrophils Relative 59 %      Lymphocytes Relative 30 %      Monocytes Relative 8 %      Eosinophils Relative 2 %      Basophils Relative 1 %      Neutrophils Absolute 4 40 Thousands/µL      Lymphocytes Absolute 2 30 Thousands/µL      Monocytes Absolute 0 60 Thousand/µL      Eosinophils Absolute 0 20 Thousand/µL      Basophils Absolute 0 10 Thousands/µL                  X-ray chest 1 view portable    (Results Pending)              Procedures  ECG 12 Lead Documentation Only    Date/Time: 9/2/2021 5:26 PM  Performed by: Brad Acosta DO  Authorized by: Brad Acosta DO     ECG reviewed by me, the ED Provider: yes    Patient location:  ED  Previous ECG:     Previous ECG:  Compared to current    Similarity:  No change  Interpretation:     Interpretation: normal    Rate:     ECG rate assessment: normal    Rhythm:     Rhythm: sinus rhythm    Ectopy:     Ectopy: none    QRS:     QRS axis:  Normal    QRS intervals:  Normal  Conduction:     Conduction: normal    ST segments:     ST segments:  Normal  T waves:     T waves: normal               ED Course             HEART Risk Score      Most Recent Value   Heart Score Risk Calculator   History  1 Filed at: 09/02/2021 1516   ECG  0 Filed at: 09/02/2021 1516   Age  1 Filed at: 09/02/2021 1516   Risk Factors  2 Filed at: 09/02/2021 1516   Troponin  0 Filed at: 09/02/2021 1516   HEART Score  4 Filed at: 09/02/2021 1516                      SBIRT 20yo+      Most Recent Value   SBIRT (23 yo +)   In order to provide better care to our patients, we are screening all of our patients for alcohol and drug use  Would it be okay to ask you these screening questions? Yes Filed at: 09/02/2021 1412   Initial Alcohol Screen: US AUDIT-C    1  How often do you have a drink containing alcohol?  0 Filed at: 09/02/2021 1412   2  How many drinks containing alcohol do you have on a typical day you are drinking? 0 Filed at: 09/02/2021 1412   3a  Male UNDER 65: How often do you have five or more drinks on one occasion? 0 Filed at: 09/02/2021 1412   3b  FEMALE Any Age, or MALE 65+: How often do you have 4 or more drinks on one occassion? 0 Filed at: 09/02/2021 1412   Audit-C Score  0 Filed at: 09/02/2021 1412   CLARISSE: How many times in the past year have you    Used an illegal drug or used a prescription medication for non-medical reasons?   Never Filed at: 09/02/2021 1412                    MDM  Number of Diagnoses or Management Options  Chest pain: new and requires workup  Diagnosis management comments: Background: 62 y o  female with chest pain    Differential DX includes but is not limited to: acs/mi, pe, pleurisy, dissection, pneumonia, musculoskeletal chest pain    Plan: cardiac workup    Heart score of 4, will admit for obv  Amount and/or Complexity of Data Reviewed  Clinical lab tests: ordered and reviewed  Tests in the radiology section of CPT®: ordered and reviewed  Review and summarize past medical records: yes  Independent visualization of images, tracings, or specimens: yes        Disposition  Final diagnoses:   Chest pain     Time reflects when diagnosis was documented in both MDM as applicable and the Disposition within this note     Time User Action Codes Description Comment    9/2/2021  3:25 PM Caro Margarita Add [R07 9] Chest pain       ED Disposition     ED Disposition Condition Date/Time Comment    Admit Stable Thu Sep 2, 2021  3:25 PM         Follow-up Information    None         Current Discharge Medication List      CONTINUE these medications which have NOT CHANGED    Details   amLODIPine (NORVASC) 10 mg tablet Take 10 mg by mouth daily      Dapagliflozin-Metformin HCl ER (XIGDUO XR)  MG TB24 Take by mouth      insulin lispro protamine-insulin lispro (HUMALOG MIX 75/25) 100 units/mL inject by subcutaneous route as per insulin sliding scale protocol      losartan (COZAAR) 100 MG tablet Take 100 mg by mouth daily      traMADol (ULTRAM) 50 mg tablet 1-2 tablets by mouth every 6 hours as needed for pain  Qty: 20 tablet, Refills: 0           No discharge procedures on file      PDMP Review     None          ED Provider  Electronically Signed by           Parish Diggs DO  09/02/21 1053

## 2021-09-02 NOTE — ASSESSMENT & PLAN NOTE
Lab Results   Component Value Date    EGFR 53 (L) 09/02/2021    EGFR 57 07/25/2018    EGFR >60 0 08/24/2016    CREATININE 1 28 (H) 09/02/2021    CREATININE 1 23 07/25/2018    CREATININE 0 99 08/24/2016     -baseline Cr 1 2

## 2021-09-02 NOTE — PLAN OF CARE
Problem: Potential for Falls  Goal: Patient will remain free of falls  Description: INTERVENTIONS:  - Educate patient/family on patient safety including physical limitations  - Instruct patient to call for assistance with activity   - Consult OT/PT to assist with strengthening/mobility   - Keep Call bell within reach  - Keep bed low and locked with side rails adjusted as appropriate  - Keep care items and personal belongings within reach  - Initiate and maintain comfort rounds  - Make Fall Risk Sign visible to staff  - Apply yellow socks and bracelet for high fall risk patients  - Consider moving patient to room near nurses station  Outcome: Progressing     Problem: PAIN - ADULT  Goal: Verbalizes/displays adequate comfort level or baseline comfort level  Description: Interventions:  - Encourage patient to monitor pain and request assistance  - Assess pain using appropriate pain scale  - Administer analgesics based on type and severity of pain and evaluate response  - Implement non-pharmacological measures as appropriate and evaluate response  - Consider cultural and social influences on pain and pain management  - Notify physician/advanced practitioner if interventions unsuccessful or patient reports new pain  Outcome: Progressing     Problem: INFECTION - ADULT  Goal: Absence or prevention of progression during hospitalization  Description: INTERVENTIONS:  - Assess and monitor for signs and symptoms of infection  - Monitor lab/diagnostic results  - Monitor all insertion sites, i e  indwelling lines, tubes, and drains  - Monitor endotracheal if appropriate and nasal secretions for changes in amount and color  - Garden appropriate cooling/warming therapies per order  - Administer medications as ordered  - Instruct and encourage patient and family to use good hand hygiene technique  - Identify and instruct in appropriate isolation precautions for identified infection/condition  Outcome: Progressing  Goal: Absence of fever/infection during neutropenic period  Description: INTERVENTIONS:  - Monitor WBC    Outcome: Progressing     Problem: SAFETY ADULT  Goal: Patient will remain free of falls  Description: INTERVENTIONS:  - Educate patient/family on patient safety including physical limitations  - Instruct patient to call for assistance with activity   - Consult OT/PT to assist with strengthening/mobility   - Keep Call bell within reach  - Keep bed low and locked with side rails adjusted as appropriate  - Keep care items and personal belongings within reach  - Initiate and maintain comfort rounds  - Make Fall Risk Sign visible to staff  - Apply yellow socks and bracelet for high fall risk patients  - Consider moving patient to room near nurses station  Outcome: Progressing  Goal: Maintain or return to baseline ADL function  Description: INTERVENTIONS:  -  Assess patient's ability to carry out ADLs; assess patient's baseline for ADL function and identify physical deficits which impact ability to perform ADLs (bathing, care of mouth/teeth, toileting, grooming, dressing, etc )  - Assess/evaluate cause of self-care deficits   - Assess range of motion  - Assess patient's mobility; develop plan if impaired  - Assess patient's need for assistive devices and provide as appropriate  - Encourage maximum independence but intervene and supervise when necessary  - Involve family in performance of ADLs  - Assess for home care needs following discharge   - Consider OT consult to assist with ADL evaluation and planning for discharge  - Provide patient education as appropriate  Outcome: Progressing  Goal: Maintains/Returns to pre admission functional level  Description: INTERVENTIONS:  - Perform BMAT or MOVE assessment daily    - Set and communicate daily mobility goal to care team and patient/family/caregiver  - Collaborate with rehabilitation services on mobility goals if consulted  - Perform Range of Motion 4 times a day    - Reposition patient every 4 hours  - Dangle patient 4 times a day  - Stand patient 4 times a day  - Ambulate patient 4 times a day  - Out of bed to chair 4 times a day   - Out of bed for meals 4 times a day  - Out of bed for toileting  - Record patient progress and toleration of activity level   Outcome: Progressing     Problem: DISCHARGE PLANNING  Goal: Discharge to home or other facility with appropriate resources  Description: INTERVENTIONS:  - Identify barriers to discharge w/patient and caregiver  - Arrange for needed discharge resources and transportation as appropriate  - Identify discharge learning needs (meds, wound care, etc )  - Arrange for interpretive services to assist at discharge as needed  - Refer to Case Management Department for coordinating discharge planning if the patient needs post-hospital services based on physician/advanced practitioner order or complex needs related to functional status, cognitive ability, or social support system  Outcome: Progressing     Problem: Knowledge Deficit  Goal: Patient/family/caregiver demonstrates understanding of disease process, treatment plan, medications, and discharge instructions  Description: Complete learning assessment and assess knowledge base    Interventions:  - Provide teaching at level of understanding  - Provide teaching via preferred learning methods  Outcome: Progressing

## 2021-09-02 NOTE — ASSESSMENT & PLAN NOTE
Lab Results   Component Value Date    HGBA1C 9 8 (H) 04/21/2021       No results for input(s): POCGLU in the last 72 hours      Blood Sugar Average: Last 72 hrs:     -cont with SSI while in the hospital  -resume home antihyperglycemic agents on d/c

## 2021-09-03 VITALS
TEMPERATURE: 97.4 F | RESPIRATION RATE: 18 BRPM | HEART RATE: 91 BPM | SYSTOLIC BLOOD PRESSURE: 163 MMHG | OXYGEN SATURATION: 93 % | BODY MASS INDEX: 39.23 KG/M2 | WEIGHT: 235.45 LBS | HEIGHT: 65 IN | DIASTOLIC BLOOD PRESSURE: 98 MMHG

## 2021-09-03 PROBLEM — R07.89 ATYPICAL CHEST PAIN: Status: ACTIVE | Noted: 2021-09-02

## 2021-09-03 PROBLEM — E66.01 CLASS 2 SEVERE OBESITY DUE TO EXCESS CALORIES WITH SERIOUS COMORBIDITY AND BODY MASS INDEX (BMI) OF 39.0 TO 39.9 IN ADULT (HCC): Status: ACTIVE | Noted: 2021-09-03

## 2021-09-03 LAB
ATRIAL RATE: 79 BPM
ATRIAL RATE: 81 BPM
ATRIAL RATE: 83 BPM
ATRIAL RATE: 93 BPM
GLUCOSE SERPL-MCNC: 211 MG/DL (ref 65–140)
GLUCOSE SERPL-MCNC: 226 MG/DL (ref 65–140)
P AXIS: 50 DEGREES
P AXIS: 51 DEGREES
P AXIS: 53 DEGREES
P AXIS: 63 DEGREES
PR INTERVAL: 190 MS
PR INTERVAL: 196 MS
PR INTERVAL: 198 MS
PR INTERVAL: 198 MS
QRS AXIS: -14 DEGREES
QRS AXIS: 1 DEGREES
QRSD INTERVAL: 72 MS
QRSD INTERVAL: 72 MS
QRSD INTERVAL: 74 MS
QRSD INTERVAL: 74 MS
QT INTERVAL: 352 MS
QT INTERVAL: 374 MS
QT INTERVAL: 376 MS
QT INTERVAL: 378 MS
QTC INTERVAL: 428 MS
QTC INTERVAL: 437 MS
QTC INTERVAL: 439 MS
QTC INTERVAL: 441 MS
T WAVE AXIS: 24 DEGREES
T WAVE AXIS: 25 DEGREES
T WAVE AXIS: 32 DEGREES
T WAVE AXIS: 52 DEGREES
VENTRICULAR RATE: 79 BPM
VENTRICULAR RATE: 81 BPM
VENTRICULAR RATE: 83 BPM
VENTRICULAR RATE: 93 BPM

## 2021-09-03 PROCEDURE — 82948 REAGENT STRIP/BLOOD GLUCOSE: CPT

## 2021-09-03 PROCEDURE — 99205 OFFICE O/P NEW HI 60 MIN: CPT | Performed by: INTERNAL MEDICINE

## 2021-09-03 PROCEDURE — 93010 ELECTROCARDIOGRAM REPORT: CPT | Performed by: INTERNAL MEDICINE

## 2021-09-03 PROCEDURE — 99217 PR OBSERVATION CARE DISCHARGE MANAGEMENT: CPT | Performed by: HOSPITALIST

## 2021-09-03 RX ADMIN — INSULIN LISPRO 4 UNITS: 100 INJECTION, SOLUTION INTRAVENOUS; SUBCUTANEOUS at 12:06

## 2021-09-03 RX ADMIN — LOSARTAN POTASSIUM 100 MG: 50 TABLET, FILM COATED ORAL at 08:48

## 2021-09-03 RX ADMIN — INSULIN LISPRO 4 UNITS: 100 INJECTION, SOLUTION INTRAVENOUS; SUBCUTANEOUS at 06:11

## 2021-09-03 RX ADMIN — AMLODIPINE BESYLATE 10 MG: 10 TABLET ORAL at 08:48

## 2021-09-03 RX ADMIN — ACETAMINOPHEN 650 MG: 325 TABLET ORAL at 06:17

## 2021-09-03 NOTE — ASSESSMENT & PLAN NOTE
-likely non-cardiac  -ECG (read by me): NSR 93, nl axis/intervals, no acute ST/TW changes  -Troponin NEG x 4  -telemetry, reviewed by me, sinus rhythm  -c/s cards

## 2021-09-03 NOTE — DISCHARGE SUMMARY
[facility]  Discharge- Jon Hardy 1960, 62 y o  female MRN: [id number]  Unit/Bed#: 7T [facility] 709-01 Encounter: [id number]  Primary Care Provider: No primary care provider on file  Date and time admitted to hospital: 9/2/2021  1:44 PM    Class 2 severe obesity due to excess calories with serious comorbidity and body mass index (BMI) of 39 0 to 39 9 in adult [facility])  Assessment & Plan  -encourage weight loss  -affects all aspects of care    Type 2 diabetes mellitus with chronic kidney disease, without long-term current use of insulin [facility])  Assessment & Plan  Lab Results   Component Value Date    HGBA1C 8 5 (H) 09/02/2021       Recent Labs     09/02/21  1657 09/02/21 2023 09/03/21  0539 09/03/21  1109   POCGLU 209* 224* 211* 226*       Blood Sugar Average: Last 72 hrs:  (P) 217 5   -was place on SSI while in the hospital  -resume home antihyperglycemic agents on d/c  -poorly controlled, will need to discuss medication changes with PCP    Essential hypertension  Assessment & Plan  -cont Norvasc/Cozaar    Stage 3a chronic kidney disease [facility])  Assessment & Plan  Lab Results   Component Value Date    EGFR 53 (L) 09/02/2021    EGFR 57 07/25/2018    EGFR >60 0 08/24/2016    CREATININE 1 28 (H) 09/02/2021    CREATININE 1 23 07/25/2018    CREATININE 0 99 08/24/2016     -baseline Cr 1 2    * Atypical chest pain  Assessment & Plan  -likely non-cardiac  -ECG (read by me): NSR 93, nl axis/intervals, no acute ST/TW changes  -Troponin NEG x 4  -telemetry, reviewed by me, sinus rhythm  -appreciate Cardiology consult  Dr Timmons Officer agrees this is [facility] CP and reports no further cardiac work up is needed      Discharging Physician / Practitioner: Caitlin Castanon MD  PCP: No primary care provider on file    Admission Date:   Admission Orders (From admission, onward)     Ordered        09/02/21 1526  Place in Observation  Once                   Discharge Date: 09/03/21    Medical Problems     Resolved Problems  Date Reviewed: 9/3/2021    None                Consultations During Hospital Stay:  · Cardiology    Procedures Performed:   · None    Significant Findings / Test Results:   · See above    Incidental Findings:   · None     Test Results Pending at Discharge (will require follow up): · None     Outpatient Tests Requested:  · Hemoglobin A1c in 3 months    Complications:  None    Reason for Admission:  Chest pain    Hospital Course:     Herminia Marques is a 62 y o  female patient who originally presented to the hospital on 9/2/2021 due to chest pain as outlined in the H&P done on admission  Please see above list of diagnoses and related plan for additional information  Condition at Discharge: good     Discharge Day Visit / Exam:     * Please refer to separate progress note for these details *    Discharge instructions/Information to patient and family:   See after visit summary for information provided to patient and family  Provisions for Follow-Up Care:  See after visit summary for information related to follow-up care and any pertinent home health orders  Disposition:     Home      Planned Readmission: None     Discharge Statement:  I spent 31 minutes discharging the patient  This time was spent on the day of discharge  I had direct contact with the patient on the day of discharge  Greater than 50% of the total time was spent examining patient, answering all patient questions, arranging and discussing plan of care with patient as well as directly providing post-discharge instructions  Additional time then spent on discharge activities  Discharge Medications:  See after visit summary for reconciled discharge medications provided to patient and family        ** Please Note: This note has been constructed using a voice recognition system **

## 2021-09-03 NOTE — NURSING NOTE
Patient discharged to home, IV removed  Discharge instructions given to patient with stated understanding  Patient left unit via w/c with belongings in stable condition

## 2021-09-03 NOTE — DISCHARGE INSTRUCTIONS
Chest Pain   WHAT YOU NEED TO KNOW:   Chest pain can be caused by a range of conditions, from not serious to life-threatening  Chest pain can be a symptom of a digestive problem, such as acid reflux or a stomach ulcer  An anxiety attack or a strong emotion, such as anger, can also cause chest pain  Infection, inflammation, or a fracture in the bones or cartilage in your chest can cause pain or discomfort  Sometimes chest pain or pressure is caused by poor blood flow to your heart (angina)  Chest pain may also be caused by life-threatening conditions such as a heart attack or blood clot in your lungs  DISCHARGE INSTRUCTIONS:   Call your local emergency number (911 in the [address] [facility]) or have someone call if:   · You have any of the following signs of a heart attack:      ? Squeezing, pressure, or pain in your chest    ? You may  also have any of the following:     § Discomfort or pain in your back, neck, jaw, stomach, or arm    § Shortness of breath    § Nausea or vomiting    § Lightheadedness or a sudden cold sweat      Seek care immediately if:   · You have chest discomfort that gets worse, even with medicine  · You cough or vomit blood  · Your bowel movements are black or bloody  · You cannot stop vomiting, or it hurts to swallow  Call your doctor if:   · You have questions or concerns about your condition or care  Medicines:   · Medicines  may be given to treat the cause of your chest pain  Examples include pain medicine, anxiety medicine, or medicines to increase blood flow to your heart  · Do not take certain medicines without asking your healthcare provider first   These include NSAIDs, herbal or vitamin supplements, or hormones (estrogen or progestin)  · Take your medicine as directed  Contact your healthcare provider if you think your medicine is not helping or if you have side effects  Tell him or her if you are allergic to any medicine   Keep a list of the medicines, vitamins, and herbs you take  Include the amounts, and when and why you take them  Bring the list or the pill bottles to follow-up visits  Carry your medicine list with you in case of an emergency  Healthy living tips: The following are general healthy guidelines  If the cause of your chest pain is known, your healthcare provider will give you specific guidelines to follow  · Do not smoke  Nicotine and other chemicals in cigarettes and cigars can cause lung and heart damage  Ask your healthcare provider for information if you currently smoke and need help to quit  E-cigarettes or smokeless tobacco still contain nicotine  Talk to your healthcare provider before you use these products  · Choose a variety of healthy foods as often as possible  Include fresh, frozen, or canned fruits and vegetables  Also include low-fat dairy products, fish, chicken (without skin), and lean meats  Your healthcare provider or a dietitian can help you create meal plans  You may need to avoid certain foods or drinks if your pain is caused by a digestion problem  · Lower your sodium (salt) intake  Limit foods that are high in sodium, such as canned foods, salty snacks, and cold cuts  If you add salt when you cook food, do not add more at the table  Choose low-sodium canned foods as much as possible  · Drink plenty of water every day  Water helps your body to control temperature and blood pressure  Ask your healthcare provider how much water you should drink every day  · Ask about activity  Your healthcare provider will tell you which activities to limit or avoid  Ask when you can drive, return to work, and have sex  Ask about the best exercise plan for you  · Maintain a healthy weight  Ask your healthcare provider what a healthy weight is for you  Ask him or her to help you create a safe weight loss plan if you are overweight  · Ask about vaccines you may need    Get the influenza (flu) vaccine every year as soon as recommended, usually in September or October  You may also need a pneumococcal vaccine to prevent pneumonia  The vaccine is usually given every 5 years, starting at age 72  Your healthcare provider can tell you if should get other vaccines, and when to get them  Follow up with your healthcare provider within 72 hours, or as directed: You may need to return for more tests to find the cause of your chest pain  You may be referred to a specialist, such as a cardiologist or gastroenterologist  Write down your questions so you remember to ask them during your visits  © Copyright [employer] 2021 Information is for End User's use only and may not be sold, redistributed or otherwise used for commercial purposes  All illustrations and images included in CareNotes® are the copyrighted property of [employer]  or Hayden Dolan  The above information is an  only  It is not intended as medical advice for individual conditions or treatments  Talk to your doctor, nurse or pharmacist before following any medical regimen to see if it is safe and effective for you

## 2021-09-03 NOTE — PLAN OF CARE
Problem: PAIN - ADULT  Goal: Verbalizes/displays adequate comfort level or baseline comfort level  Description: Interventions:  - Encourage patient to monitor pain and request assistance  - Assess pain using appropriate pain scale  - Administer analgesics based on type and severity of pain and evaluate response  - Implement non-pharmacological measures as appropriate and evaluate response  - Consider cultural and social influences on pain and pain management  - Notify physician/advanced practitioner if interventions unsuccessful or patient reports new pain  Outcome: Progressing     Problem: SAFETY ADULT  Goal: Patient will remain free of falls  Description: INTERVENTIONS:  - Educate patient/family on patient safety including physical limitations  - Instruct patient to call for assistance with activity   - Consult OT/PT to assist with strengthening/mobility   - Keep Call bell within reach  - Keep bed low and locked with side rails adjusted as appropriate  - Keep care items and personal belongings within reach  - Initiate and maintain comfort rounds  - Make Fall Risk Sign visible to staff  - Apply yellow socks and bracelet for high fall risk patients  - Consider moving patient to room near nurses station  Outcome: Progressing     Problem: SAFETY ADULT  Goal: Maintain or return to baseline ADL function  Description: INTERVENTIONS:  -  Assess patient's ability to carry out ADLs; assess patient's baseline for ADL function and identify physical deficits which impact ability to perform ADLs (bathing, care of mouth/teeth, toileting, grooming, dressing, etc )  - Assess/evaluate cause of self-care deficits   - Assess range of motion  - Assess patient's mobility; develop plan if impaired  - Assess patient's need for assistive devices and provide as appropriate  - Encourage maximum independence but intervene and supervise when necessary  - Involve family in performance of ADLs  - Assess for home care needs following discharge - Consider OT consult to assist with ADL evaluation and planning for discharge  - Provide patient education as appropriate  Outcome: Progressing     Problem: DISCHARGE PLANNING  Goal: Discharge to home or other facility with appropriate resources  Description: INTERVENTIONS:  - Identify barriers to discharge w/patient and caregiver  - Arrange for needed discharge resources and transportation as appropriate  - Identify discharge learning needs (meds, wound care, etc )  - Arrange for interpretive services to assist at discharge as needed  - Refer to Case Management Department for coordinating discharge planning if the patient needs post-hospital services based on physician/advanced practitioner order or complex needs related to functional status, cognitive ability, or social support system  Outcome: Progressing     Problem: Knowledge Deficit  Goal: Patient/family/caregiver demonstrates understanding of disease process, treatment plan, medications, and discharge instructions  Description: Complete learning assessment and assess knowledge base    Interventions:  - Provide teaching at level of understanding  - Provide teaching via preferred learning methods  Outcome: Progressing

## 2021-09-03 NOTE — ASSESSMENT & PLAN NOTE
Lab Results   Component Value Date    HGBA1C 8 5 (H) 09/02/2021       Recent Labs     09/02/21  1657 09/02/21 2023 09/03/21  0539   POCGLU 209* 224* 211*       Blood Sugar Average: Last 72 hrs:  (P) 224 [id number]   -cont with SSI while in the hospital  -resume home antihyperglycemic agents on d/c  -poorly controlled, will need to discuss medication changes with PCP

## 2021-09-03 NOTE — CONSULTS
Consult - Cardiology   Vinita Claudio 62 y o  female MRN: [id number]  Unit/Bed#: 7T [facility] 709-01 Encounter: [id number]          Reason For Consult:  Chest pain    History Of Present Illness: The patient is a 56-year-old female with history of obesity, diabetes mellitus type 2, hyper tension and hyperlipidemia  She presented to the hospital with central chest pain yesterday  She has a known history of fibromyalgia and states she normally does have this discomfort but it was more severe  It was clearly worse with palpation and movement  She states it has gotten better since she has been here  She describes it more as as a sharp pain  It is unrelated change of position or deep inspiration  She states she does have dyspnea on exertion which is perhaps somewhat worse  She denies peripheral edema  His  She does get occasional palpitations which she describes as an irregularity of her heart rate which is rather brief  She does get occasional dizziness    Past Medical History  Diabetes  Hyperlipidemia-not on statin therapy  Hypertension  Fibromyalgia  Partial hysterectomy  Stage IIIA chronic kidney disease          Allergy:  Allergies   Allergen Reactions    Other Shortness Of Breath    Demerol [Meperidine]        Medications:  Amlodipine 10 mg daily  Metformin  Insulin  Losartan 100 mg daily  Tramadol      Family History:  Remarkable for vague described heart disease in an aunt and maternal grandmother    Social History:  Never smoked  Does not drink alcohol        ROS:  A 12 system review of systems was performed and remarkable for fatigue, poor appetite, modest weight loss, urinary frequency, multiple joint pains and the previously mentioned chest pain, palpitations and dyspnea  Dizziness as noted  Occasional headaches  Otherwise negative    Exam:  148/92  Pulse 86  General:  Obese middle-aged female no acute distress  Head: Normocephalic, atraumatic  Eyes: No Icterus    Normal Conjunctiva  Oropharynx: normal-appearing mucosa and no pharyngitis, no exudate  Neck: supple, symmetrical, trachea midline, thyroid: not enlarged, symmetric, no tenderness/mass/nodules, no carotid bruit and no JVD   Heart: RRR, grade 1 systolic murmur at the left upper sternal border  No diastolic murmur rub or gallop  Respiratory effort:  No dyspnea at rest   Some central chest wall tenderness reproducing patient's symptoms  Lungs: normal air entry, lungs clear to auscultation and no rales, rhonchi or wheezing  Abdomen: obese, normal findings: no masses palpable, no organomegaly and soft, non-tender  Lower Limbs:  No edema  Dorsalis pedal and posterior tibial pulses intact  Musculoskeletal:  No back or joint deformity  Neurologic:  Oriented without focal motor sensory findings    EKG:  Normal sinus rhythm  Normal EKG  Troponins normal  ProBNP 33 7  Hemoglobin 12 8, white blood count 7 6, platelets 163,728  Potassium 4 7, BUN 15, creatinine 1 28, glucose 307  Lipid panel from April showed HDL cholesterol 44, triglycerides 168 and LDL cholesterol 154      ASSESSMENT AND PLAN:   1  Atypical chest pain  Myocardial infarction ruled out  Strongly suspect this is musculoskeletal since it is reproducible and similar to her fibromyalgia pain  Her EKG is benign and she has ruled out for myocardial infarction  No further cardiac testing needed  2  Hypertension  Blood pressure fairly elevated on presentation but better now unusual regimen of amlodipine 10 mg daily and losartan 100 mg daily  Continue same  Suspect elevated blood pressure likely related to pain  3  Hyperlipidemia  Untreated  Was on a statin a year ago  In light of diabetes mellitus would strongly suggest restarting statin  Would favor atorvastatin 20 mg daily  Told patient this can be initiated as outpatient by PCP  4  DM  5   Fibromyalgia  6 obesity      Will see prfannie Rodriguez MD

## 2021-09-03 NOTE — ASSESSMENT & PLAN NOTE
-likely non-cardiac  -ECG (read by me): NSR 93, nl axis/intervals, no acute ST/TW changes  -Troponin NEG x 4  -telemetry, reviewed by me, sinus rhythm  -appreciate Cardiology consult    Dr Lanie Cuenca agrees this is [facility] CP and reports no further cardiac work up is needed

## 2021-09-03 NOTE — CASE MANAGEMENT
CM met with pt at bedside  Pt alert and oriented  CM reviewed observation status with pt  Pt signed the obs notice and CM placed it into scan bin for medical records  Pt reported that she lives at home with her , son, daughter in law and grandchildren  Pt reported that she is ADL independent  No dme needs  She ambulates on her own without an assistive device  She reported that she is employed and works from home  No hx of VNA or SNF  She denied hx of D&A, tobacco and MH concerns  She uses [facility] on [address] in [place]  She has [employer]  Pt drives  Her  will transport her home at discharge

## 2021-09-03 NOTE — UTILIZATION REVIEW
Initial Clinical Review    Admission: Date/Time/Statement:   Admission Orders (From admission, onward)     Ordered        09/02/21 1526  Place in Observation  Once                   Orders Placed This Encounter   Procedures    Place in Observation     Standing Status:   Standing     Number of Occurrences:   1     Order Specific Question:   Level of Care     Answer:   Med Surg [16]     ED Arrival Information     Expected Arrival Acuity    - 9/2/2021 13:35 Emergent         Means of arrival Escorted by Service Admission type    [facility]         Arrival complaint    Chest Pain        Chief Complaint   Patient presents with    Chest Pain     pt c/o "sternal" chest pain that started about an hour ago w/ radiation to the right        Initial Presentation:  62 y o  female who presents to ED as a walk-in with a chief c/o chest pain  The patient reports around 1130 this morning she noticed sharp retrosternal chest pain that radiated into her right chest   She describes it as a fork stabbing me in tearing me open   CP a/w sob  At that time her pain was 11/10, after IV morphine given in the ED it is now 4 5/10  Patient does report that with her fibromyalgia she has had similar chest pain in the past but it has never been this severe  She reports some mild associated nausea and lightheadedness   In ED trop neg  EKG NSR, no acute ST-TW changes  Admit observation to M/S/Tele unit -- monitor on telem  Serial trops, continue previous home po meds    Consult cardiology    Date: 9/3   Day 2:     ED Triage Vitals   Temperature Pulse Respirations Blood Pressure SpO2   09/02/21 1559 09/02/21 1353 09/02/21 1353 09/02/21 1353 09/02/21 1353   (!) 96 3 °F (35 7 °C) 101 18 125/67 96 %      Temp Source Heart Rate Source Patient Position - Orthostatic VS BP Location FiO2 (%)   09/02/21 1559 09/02/21 1353 09/02/21 1353 09/02/21 1353 --   Temporal Monitor Sitting Left arm       Pain Score       09/02/21 1513 Worst Possible Pain          Wt Readings from Last 1 Encounters:   09/02/21 107 kg (235 lb 7 2 oz)     Additional Vital Signs:   Date/Time  Temp  Pulse  Resp  BP  MAP (mmHg)  SpO2  O2 Device  Patient Position - Orthostatic VS   09/03/21 0739  97 5 °F (36 4 °C)  86  18  148/92  103  95 %  None (Room air)  Lying   09/02/21 2315  96 5 °F (35 8 °C)Abnormal   86  18  134/77  --  94 %  None (Room air)  Lying   09/02/21 1803  --  85  --  151/91  112  --  --  --   09/02/21 1559  96 3 °F (35 7 °C)Abnormal   86  18  182/96Abnormal   116  96 %  None (Room air)  Lying   09/02/21 1500  --  88  18  151/81  --  97 %  None (Room air)  Lying   09/02/21 1430  --  86  16  131/74  --  97 %  None (Room air)  Lying   09/02/21 1353  --  101  18  125/67  --  96 %  None (Room air)  Sitting       Pertinent Labs/Diagnostic Test Results:   CXR 9/3 -- No acute cardiopulmonary disease         Results from last 7 days   Lab Units 09/02/21  1410   WBC Thousand/uL 7 60   HEMOGLOBIN g/dL 12 5   HEMATOCRIT % 38 0   PLATELETS Thousands/uL 448   NEUTROS ABS Thousands/µL 4 40     Results from last 7 days   Lab Units 09/02/21  1410   SODIUM mmol/L 137   POTASSIUM mmol/L 4 7   CHLORIDE mmol/L 100   CO2 mmol/L 27   ANION GAP mmol/L 10   BUN mg/dL 15   CREATININE mg/dL 1 28*   EGFR ml/min/1 73sq m 53*   CALCIUM mg/dL 9 7       Results from last 7 days   Lab Units 09/03/21  0539 09/02/21 2023 09/02/21  1657   POC GLUCOSE mg/dl 211* 224* 209*     Results from last 7 days   Lab Units 09/02/21  1410   GLUCOSE RANDOM mg/dL 307*     Results from last 7 days   Lab Units 09/02/21  1410   HEMOGLOBIN A1C % 8 5*   EAG mg/dl 197     Results from last 7 days   Lab Units 09/02/21  2222 09/02/21 2015 09/02/21  1659 09/02/21  1410   TROPONIN I ng/mL <0 01 <0 01 <0 01 <0 01       Results from last 7 days   Lab Units 09/02/21  1410   NT-PRO BNP pg/mL 33 7     Results from last 7 days   Lab Units 09/02/21  1410   LIPASE u/L 66     ED Treatment:   Medication Administration from 09/02/2021 1335 to 09/02/2021 1554       Date/Time Order Dose Route Action     09/02/2021 1513 aspirin chewable tablet 324 mg 324 mg Oral Given     09/02/2021 1513 morphine (PF) 4 mg/mL injection 4 mg 4 mg Intravenous Given     Past Medical History:   Diagnosis Date    Diabetes mellitus ([facility] )     Fibromyalgia     Hypertension      Present on Admission:   Stage [facility]   Chest pain   Essential hypertension   Type 2 diabetes mellitus with chronic kidney disease, without long-term current use of insulin (HCC)      Admitting Diagnosis: Chest pain [R07 9]  Age/Sex: 62 y o  female  Admission Orders:  Scheduled Medications:  amLODIPine, 10 mg, Oral, Daily  insulin lispro, 2-12 Units, Subcutaneous, TID AC  losartan, 100 mg, Oral, Daily         PRN Meds:  acetaminophen, 650 mg, Oral, Q6H PRN  ondansetron, 4 mg, Intravenous, Q4H PRN  sodium chloride (PF), 3 mL, Intravenous, Q1H PRN        Network Utilization Review Department  ATTENTION: Please call with any questions or concerns to [phone] and carefully listen to the prompts so that you are directed to the right person  All voicemails are confidential   Chiquita Riley all requests for admission clinical reviews, approved or denied determinations and any other requests to dedicated fax number below belonging to the campus where the patient is receiving treatment   List of dedicated fax numbers for the Facilities:  [address] DENIALS (Administrative/Medical Necessity) [phone]   [address] (Maternity/NICU/Pediatrics) [phone]   [address] [phone]   [address] Linda Mayes [id number] [facility] [phone] [address] [facility] Pierre Espinosa 1481 [address] [facility] [phone]

## 2021-09-03 NOTE — ASSESSMENT & PLAN NOTE
Lab Results   Component Value Date    HGBA1C 8 5 (H) 09/02/2021       Recent Labs     09/02/21  1657 09/02/21 2023 09/03/21  0539 09/03/21  1109   POCGLU 209* 224* 211* 226*       Blood Sugar Average: Last 72 hrs:  (P) 217 5   -was place on SSI while in the hospital  -resume home antihyperglycemic agents on d/c  -poorly controlled, will need to discuss medication changes with PCP

## 2022-07-26 ENCOUNTER — APPOINTMENT (EMERGENCY)
Dept: RADIOLOGY | Facility: HOSPITAL | Age: 62
End: 2022-07-26
Payer: COMMERCIAL

## 2022-07-26 ENCOUNTER — HOSPITAL ENCOUNTER (EMERGENCY)
Facility: HOSPITAL | Age: 62
Discharge: HOME | End: 2022-07-26
Attending: EMERGENCY MEDICINE
Payer: COMMERCIAL

## 2022-07-26 VITALS
OXYGEN SATURATION: 96 % | TEMPERATURE: 97.9 F | RESPIRATION RATE: 18 BRPM | SYSTOLIC BLOOD PRESSURE: 163 MMHG | HEIGHT: 65 IN | HEART RATE: 82 BPM | BODY MASS INDEX: 39.18 KG/M2 | DIASTOLIC BLOOD PRESSURE: 80 MMHG

## 2022-07-26 DIAGNOSIS — R07.9 CHEST PAIN, UNSPECIFIED: Primary | ICD-10-CM

## 2022-07-26 DIAGNOSIS — R03.0 ELEVATED BLOOD PRESSURE READING: ICD-10-CM

## 2022-07-26 LAB
ALBUMIN SERPL BCP-MCNC: 3.1 G/DL (ref 3.5–5)
ALP SERPL-CCNC: 168 U/L (ref 46–116)
ALT SERPL W P-5'-P-CCNC: 24 U/L (ref 12–78)
ANION GAP SERPL CALCULATED.3IONS-SCNC: 10 MMOL/L (ref 4–13)
AST SERPL W P-5'-P-CCNC: 12 U/L (ref 5–45)
ATRIAL RATE: 81 BPM
BASOPHILS # BLD AUTO: 0.05 THOUSANDS/ΜL (ref 0–0.1)
BASOPHILS NFR BLD AUTO: 1 % (ref 0–1)
BILIRUB SERPL-MCNC: 0.22 MG/DL (ref 0.2–1)
BUN SERPL-MCNC: 15 MG/DL (ref 5–25)
CALCIUM ALBUM COR SERPL-MCNC: 10.1 MG/DL (ref 8.3–10.1)
CALCIUM SERPL-MCNC: 9.4 MG/DL (ref 8.3–10.1)
CARDIAC TROPONIN I PNL SERPL HS: 3 NG/L
CHLORIDE SERPL-SCNC: 102 MMOL/L (ref 96–108)
CO2 SERPL-SCNC: 26 MMOL/L (ref 21–32)
CREAT SERPL-MCNC: 1.27 MG/DL (ref 0.6–1.3)
D DIMER PPP FEU-MCNC: 0.27 UG/ML FEU
EOSINOPHIL # BLD AUTO: 0.21 THOUSAND/ΜL (ref 0–0.61)
EOSINOPHIL NFR BLD AUTO: 2 % (ref 0–6)
ERYTHROCYTE [DISTWIDTH] IN BLOOD BY AUTOMATED COUNT: 13.4 % (ref 11.6–15.1)
GFR SERPL CREATININE-BSD FRML MDRD: 45 ML/MIN/1.73SQ M
GLUCOSE SERPL-MCNC: 70 MG/DL (ref 65–140)
HCT VFR BLD AUTO: 41 % (ref 34.8–46.1)
HGB BLD-MCNC: 13.5 G/DL (ref 11.5–15.4)
IMM GRANULOCYTES # BLD AUTO: 0.02 THOUSAND/UL (ref 0–0.2)
IMM GRANULOCYTES NFR BLD AUTO: 0 % (ref 0–2)
LIPASE SERPL-CCNC: 77 U/L (ref 73–393)
LYMPHOCYTES # BLD AUTO: 3.09 THOUSANDS/ΜL (ref 0.6–4.47)
LYMPHOCYTES NFR BLD AUTO: 31 % (ref 14–44)
MCH RBC QN AUTO: 31 PG (ref 26.8–34.3)
MCHC RBC AUTO-ENTMCNC: 32.9 G/DL (ref 31.4–37.4)
MCV RBC AUTO: 94 FL (ref 82–98)
MONOCYTES # BLD AUTO: 0.6 THOUSAND/ΜL (ref 0.17–1.22)
MONOCYTES NFR BLD AUTO: 6 % (ref 4–12)
NEUTROPHILS # BLD AUTO: 5.92 THOUSANDS/ΜL (ref 1.85–7.62)
NEUTS SEG NFR BLD AUTO: 60 % (ref 43–75)
NRBC BLD AUTO-RTO: 0 /100 WBCS
P AXIS: 63 DEGREES
PLATELET # BLD AUTO: 447 THOUSANDS/UL (ref 149–390)
PMV BLD AUTO: 8.3 FL (ref 8.9–12.7)
POTASSIUM SERPL-SCNC: 4 MMOL/L (ref 3.5–5.3)
PR INTERVAL: 198 MS
PROT SERPL-MCNC: 8.3 G/DL (ref 6.4–8.4)
QRS AXIS: -3 DEGREES
QRSD INTERVAL: 68 MS
QT INTERVAL: 362 MS
QTC INTERVAL: 420 MS
RBC # BLD AUTO: 4.36 MILLION/UL (ref 3.81–5.12)
SODIUM SERPL-SCNC: 138 MMOL/L (ref 135–147)
T WAVE AXIS: 23 DEGREES
VENTRICULAR RATE: 81 BPM
WBC # BLD AUTO: 9.89 THOUSAND/UL (ref 4.31–10.16)

## 2022-07-26 PROCEDURE — 80053 COMPREHEN METABOLIC PANEL: CPT | Performed by: EMERGENCY MEDICINE

## 2022-07-26 PROCEDURE — 99285 EMERGENCY DEPT VISIT HI MDM: CPT | Performed by: EMERGENCY MEDICINE

## 2022-07-26 PROCEDURE — 36415 COLL VENOUS BLD VENIPUNCTURE: CPT | Performed by: EMERGENCY MEDICINE

## 2022-07-26 PROCEDURE — 84484 ASSAY OF TROPONIN QUANT: CPT | Performed by: EMERGENCY MEDICINE

## 2022-07-26 PROCEDURE — 85025 COMPLETE CBC W/AUTO DIFF WBC: CPT | Performed by: EMERGENCY MEDICINE

## 2022-07-26 PROCEDURE — 99285 EMERGENCY DEPT VISIT HI MDM: CPT

## 2022-07-26 PROCEDURE — 93010 ELECTROCARDIOGRAM REPORT: CPT | Performed by: INTERNAL MEDICINE

## 2022-07-26 PROCEDURE — 85379 FIBRIN DEGRADATION QUANT: CPT | Performed by: EMERGENCY MEDICINE

## 2022-07-26 PROCEDURE — 71046 X-RAY EXAM CHEST 2 VIEWS: CPT

## 2022-07-26 PROCEDURE — 93005 ELECTROCARDIOGRAM TRACING: CPT

## 2022-07-26 PROCEDURE — 83690 ASSAY OF LIPASE: CPT | Performed by: EMERGENCY MEDICINE

## 2022-07-26 PROCEDURE — 96374 THER/PROPH/DIAG INJ IV PUSH: CPT

## 2022-07-26 PROCEDURE — 96375 TX/PRO/DX INJ NEW DRUG ADDON: CPT

## 2022-07-26 RX ORDER — ONDANSETRON 2 MG/ML
4 INJECTION INTRAMUSCULAR; INTRAVENOUS ONCE
Status: COMPLETED | OUTPATIENT
Start: 2022-07-26 | End: 2022-07-26

## 2022-07-26 RX ORDER — ONDANSETRON 2 MG/ML
4 INJECTION INTRAMUSCULAR; INTRAVENOUS ONCE
Status: DISCONTINUED | OUTPATIENT
Start: 2022-07-26 | End: 2022-07-26 | Stop reason: HOSPADM

## 2022-07-26 RX ORDER — TRAMADOL HYDROCHLORIDE 50 MG/1
50 TABLET ORAL ONCE
Status: COMPLETED | OUTPATIENT
Start: 2022-07-26 | End: 2022-07-26

## 2022-07-26 RX ORDER — ACETAMINOPHEN 325 MG/1
975 TABLET ORAL ONCE
Status: COMPLETED | OUTPATIENT
Start: 2022-07-26 | End: 2022-07-26

## 2022-07-26 RX ORDER — LIDOCAINE HYDROCHLORIDE 20 MG/ML
15 SOLUTION OROPHARYNGEAL ONCE
Status: DISCONTINUED | OUTPATIENT
Start: 2022-07-26 | End: 2022-07-26 | Stop reason: HOSPADM

## 2022-07-26 RX ORDER — MAGNESIUM HYDROXIDE/ALUMINUM HYDROXICE/SIMETHICONE 120; 1200; 1200 MG/30ML; MG/30ML; MG/30ML
30 SUSPENSION ORAL ONCE
Status: DISCONTINUED | OUTPATIENT
Start: 2022-07-26 | End: 2022-07-26 | Stop reason: HOSPADM

## 2022-07-26 RX ORDER — FAMOTIDINE 10 MG/ML
20 INJECTION, SOLUTION INTRAVENOUS ONCE
Status: COMPLETED | OUTPATIENT
Start: 2022-07-26 | End: 2022-07-26

## 2022-07-26 RX ADMIN — TRAMADOL HYDROCHLORIDE 50 MG: 50 TABLET, COATED ORAL at 12:07

## 2022-07-26 RX ADMIN — ACETAMINOPHEN 975 MG: 325 TABLET, FILM COATED ORAL at 10:43

## 2022-07-26 RX ADMIN — ONDANSETRON 4 MG: 2 INJECTION INTRAMUSCULAR; INTRAVENOUS at 10:55

## 2022-07-26 RX ADMIN — FAMOTIDINE 20 MG: 10 INJECTION INTRAVENOUS at 10:57

## 2022-07-26 NOTE — ED NOTES
Provider aware patient refusing all medications except for tramadol      Flores Olvera, ARIANE  07/26/22 [id number]

## 2022-07-26 NOTE — DISCHARGE INSTRUCTIONS
Continue Tylenol and/or Tramadol as needed for pain    Follow up with family doctor and Cardiology    Return to the ED if you have any new or worsening symptoms including but not limited to lightheadedness/dizziness, passing out, difficulty breathing, severe pain, abdominal pain, etc

## 2022-07-26 NOTE — ED PROVIDER NOTES
History  Chief Complaint   Patient presents with    Chest Pain     Pt reports continuous chest pain since Thursday  Pt reports "clawing pain" with mental confusion and fatigue  Slight SOB  62-year-old female h/o IDDM, HTN, fibromyalgia; presenting for evaluation of chest pain  Symptoms started 5 days ago without any inciting event or injury  She describes central chest pain as ripping/gripping sensation of someone grabbing her chest, constant, waxing/waning in severity, worse with movement, breathing and chest palpation  Unable to go to work or get dressed due to the pain  States chronic chest pain with her fibromyalgia, however this is worse than typical   Tried her prescribed Tramadol with minimal relief, last dose this morning  Reports some nausea without vomiting  Denies fevers, chills, cough/URI symptoms, SOB, lower abdominal pain, calf pain/swelling, peripheral edema  IDDM- Accu-Cheks at home WN L, 150s this morning  No known CAD  No history of or risk factors for VTE  MDM:  62-year-old female with CP- will get cardiac workup, D-dimer, lipase, symptomatic treatment, dispo accordingly               Prior to Admission Medications   Prescriptions Last Dose Informant Patient Reported? Taking?    Dapagliflozin-Metformin HCl ER (XIGDUO XR)  MG TB24   Yes No   Sig: Take by mouth   amLODIPine (NORVASC) 10 mg tablet   Yes No   Sig: Take 10 mg by mouth daily   insulin lispro protamine-insulin lispro (HUMALOG MIX 75/25) 100 units/mL   Yes No   Sig: inject by subcutaneous route as per insulin sliding scale protocol   losartan (COZAAR) 100 MG tablet   Yes No   Sig: Take 100 mg by mouth daily   traMADol (ULTRAM) 50 mg tablet   No No   Si-2 tablets by mouth every 6 hours as needed for pain      Facility-Administered Medications: None       Past Medical History:   Diagnosis Date    Diabetes mellitus ([facility] [address] )     Fibromyalgia     Hypertension        Past Surgical History:   Procedure Laterality Date   SECTION      HYSTERECTOMY         History reviewed  No pertinent family history  I have reviewed and agree with the history as documented  E-Cigarette/Vaping     E-Cigarette/Vaping Substances     Social History     Tobacco Use    Smoking status: Never Smoker    Smokeless tobacco: Never Used   Substance Use Topics    Alcohol use: Never    Drug use: No       Review of Systems   Constitutional: Negative for chills, fever and unexpected weight change  HENT: Negative for ear pain, rhinorrhea and sore throat  Eyes: Negative for pain and visual disturbance  Respiratory: Negative for cough and shortness of breath  Cardiovascular: Positive for chest pain  Negative for leg swelling  Gastrointestinal: Positive for nausea  Negative for abdominal pain, constipation, diarrhea and vomiting  Endocrine: Negative for polydipsia, polyphagia and polyuria  Genitourinary: Negative for dysuria, frequency, hematuria and urgency  Musculoskeletal: Negative for back pain, myalgias and neck pain  Skin: Negative for color change and rash  Allergic/Immunologic: Negative for environmental allergies and immunocompromised state  Neurological: Negative for dizziness, weakness, light-headedness, numbness and headaches  Hematological: Negative for adenopathy  Does not bruise/bleed easily  Psychiatric/Behavioral: Negative for agitation and confusion  All other systems reviewed and are negative  Physical Exam  Physical Exam  Vitals and nursing note reviewed  Constitutional:       General: She is not in acute distress  Appearance: She is well-developed  HENT:      Head: Normocephalic and atraumatic  Nose: Nose normal    Eyes:      Conjunctiva/sclera: Conjunctivae normal    Cardiovascular:      Rate and Rhythm: Normal rate and regular rhythm  Heart sounds: Normal heart sounds        Comments: Chest wall tender to palpation, no skin changes/rash, no deformity  Pulmonary:      Effort: Pulmonary effort is normal  No respiratory distress  Breath sounds: Normal breath sounds  No stridor  No wheezing or rales  Chest:      Chest wall: No tenderness  Abdominal:      General: There is no distension  Palpations: Abdomen is soft  Tenderness: There is no abdominal tenderness  There is no guarding or rebound  Musculoskeletal:         General: No swelling, tenderness or deformity  Cervical back: Normal range of motion and neck supple  Comments: No calf swelling/ttp, no peripheral edema   Skin:     General: Skin is warm and dry  Findings: No rash  Neurological:      General: No focal deficit present  Mental Status: She is alert and oriented to person, place, and time  Motor: No abnormal muscle tone  Coordination: Coordination normal    Psychiatric:         Thought Content:  Thought content normal          Judgment: Judgment normal          Vital Signs  ED Triage Vitals   Temperature Pulse Respirations Blood Pressure SpO2   07/26/22 0953 07/26/22 0955 07/26/22 0955 07/26/22 0955 07/26/22 0955   97 9 °F (36 6 °C) 85 18 (!) 178/99 97 %      Temp Source Heart Rate Source Patient Position - Orthostatic VS BP Location FiO2 (%)   07/26/22 0953 07/26/22 0955 07/26/22 0955 07/26/22 0955 --   Oral Monitor Sitting Right arm       Pain Score       07/26/22 1043       8           Vitals:    07/26/22 0955 07/26/22 1115   BP: (!) 178/99 163/80   Pulse: 85 82   Patient Position - Orthostatic VS: Sitting Lying         Visual Acuity      ED Medications  Medications   aluminum-magnesium hydroxide-simethicone (MYLANTA) oral suspension 30 mL (30 mL Oral Not Given 7/26/22 1206)   Lidocaine Viscous HCl (XYLOCAINE) 2 % mucosal solution 15 mL (15 mL Swish & Swallow Not Given 7/26/22 1207)   ondansetron (ZOFRAN) injection 4 mg (4 mg Intravenous Not Given 7/26/22 1205)   ondansetron (ZOFRAN) injection 4 mg (4 mg Intravenous Given 7/26/22 1055)   Famotidine (PF) (PEPCID) injection 20 mg (20 mg Intravenous Given 7/26/22 1057)   acetaminophen (TYLENOL) tablet 975 mg (975 mg Oral Given 7/26/22 1043)   traMADol (ULTRAM) tablet 50 mg (50 mg Oral Given 7/26/22 1207)       Diagnostic Studies  Results Reviewed     Procedure Component Value Units Date/Time    HS Troponin 0hr (reflex protocol) [[id number]]  (Normal) Collected: 07/26/22 1100    Lab Status: Final result Specimen: Blood from Arm, Left Updated: 07/26/22 1128     hs TnI 0hr 3 ng/L     Comprehensive metabolic panel [[id number]]  (Abnormal) Collected: 07/26/22 1100    Lab Status: Final result Specimen: Blood from Arm, Left Updated: 07/26/22 1123     Sodium 138 mmol/L      Potassium 4 0 mmol/L      Chloride 102 mmol/L      CO2 26 mmol/L      ANION GAP 10 mmol/L      BUN 15 mg/dL      Creatinine 1 27 mg/dL      Glucose 70 mg/dL      Calcium 9 4 mg/dL      Corrected Calcium 10 1 mg/dL      AST 12 U/L      ALT 24 U/L      Alkaline Phosphatase 168 U/L      Total Protein 8 3 g/dL      Albumin 3 1 g/dL      Total Bilirubin 0 22 mg/dL      eGFR 45 ml/min/1 73sq m     Narrative:      [facility] guidelines for Chronic Kidney Disease (CKD):     Stage 1 with normal or high GFR (GFR > 90 mL/min/1 73 square meters)    Stage 2 Mild CKD (GFR = 60-89 mL/min/1 73 square meters)    Stage 3A Moderate CKD (GFR = 45-59 mL/min/1 73 square meters)    Stage 3B Moderate CKD (GFR = 30-44 mL/min/1 73 square meters)    Stage 4 Severe CKD (GFR = 15-29 mL/min/1 73 square meters)    Stage 5 End Stage CKD (GFR <15 mL/min/1 73 square meters)  Note: GFR calculation is accurate only with a steady state creatinine    Lipase [[id number]]  (Normal) Collected: 07/26/22 1100    Lab Status: Final result Specimen: Blood from Arm, Left Updated: 07/26/22 1123     Lipase 77 u/L     D-dimer, quantitative [[id number]]  (Normal) Collected: 07/26/22 1100    Lab Status: Final result Specimen: Blood from Arm, Left Updated: 07/26/22 1117     D-Dimer, Quant 0 27 ug/ml FEU Narrative: In the evaluation for possible pulmonary embolism, in the appropriate (Well's Score of 4 or less) patient, the age adjusted d-dimer cutoff for this patient can be calculated as:    Age x 0 01 (in ug/mL) for Age-adjusted D-dimer exclusion threshold for a patient over 50 years  CBC and differential [[id number]]  (Abnormal) Collected: 07/26/22 1100    Lab Status: Final result Specimen: Blood from Arm, Left Updated: 07/26/22 1105     WBC 9 89 Thousand/uL      RBC 4 36 Million/uL      Hemoglobin 13 5 g/dL      Hematocrit 41 0 %      MCV 94 fL      MCH 31 0 pg      MCHC 32 9 g/dL      RDW 13 4 %      MPV 8 3 fL      Platelets 103 Thousands/uL      nRBC 0 /100 WBCs      Neutrophils Relative 60 %      Immat GRANS % 0 %      Lymphocytes Relative 31 %      Monocytes Relative 6 %      Eosinophils Relative 2 %      Basophils Relative 1 %      Neutrophils Absolute 5 92 Thousands/µL      Immature Grans Absolute 0 02 Thousand/uL      Lymphocytes Absolute 3 09 Thousands/µL      Monocytes Absolute 0 60 Thousand/µL      Eosinophils Absolute 0 21 Thousand/µL      Basophils Absolute 0 05 Thousands/µL                  XR chest 2 views   Final Result by Bartolo Grissom MD (07/26 1322)      No acute cardiopulmonary disease  Workstation performed: [id number]                    Procedures  Procedures         ED Course  ED Course as of 07/26/22 1354   Tue Jul 26, 2022   1030 EKG: NSR @ 81 bpm, LAD, normal intervals, nonspecific st changes, Q wave III, poor R wave progression anterior leads, nonspecific st changes, similar to prior from Sept 2021   1123 D-Dimer, Quant: 0 27   1128 hs TnI 0hr: 3   1128 Sx constant, so no need for delta trop   1129 HEART score 4   1149 Pt reassessed, still c/o pain and nausea  Reviewed unremarkable labs/EKG, pending CXR   Will try additional meds and reassess   1215 CXR interpreted by myself; no acute abn   1217 Pt accepted the Tramadol that she is chronically prescribed, but declined further medications  On reassessment, she is still c/o CP/nausea, I discussed that we can try Zofran or Reglan for nausea and try GI cocktail, but she declines  I discussed that I am unable to give Toradol with her GFR  She would prefer to just go home rather than try further medications  I instructed her to f/u with PCP and cardiology, she expressed understanding             HEART Risk Score    Flowsheet Row Most Recent Value   Heart Score Risk Calculator    History 0 Filed at: 07/26/2022 1030   ECG 1 Filed at: 07/26/2022 1030   Age 1 Filed at: 07/26/2022 1030   Risk Factors 2 Filed at: 07/26/2022 1030   Troponin 0 Filed at: 07/26/2022 1030   HEART Score 4 Filed at: 07/26/2022 1030                            Ashok Landau' Criteria for PE    Flowsheet Row Most Recent Value   Clarence' Criteria for PE    Clinical signs and symptoms of DVT 0 Filed at: 07/26/2022 1030   PE is primary diagnosis or equally likely 0 Filed at: 07/26/2022 1030   HR >100 0 Filed at: 07/26/2022 1030   Immobilization at least 3 days or Surgery in the previous 4 weeks 0 Filed at: 07/26/2022 1030   Previous, objectively diagnosed PE or DVT 0 Filed at: 07/26/2022 1030   Hemoptysis 0 Filed at: 07/26/2022 1030   Malignancy with treatment within 6 months or palliative 0 Filed at: 07/26/2022 1030   Ashok Landau' Criteria Total 0 Filed at: 07/26/2022 1030                MDM  Number of Diagnoses or Management Options  Chest pain, unspecified  Elevated blood pressure reading  Diagnosis management comments: 61 yo F presenting with constant CP for 5 days, unclear etiology  States chronic CP  ED workup unrevealing including ekg/troponin, CXR, ddimer and labs for metabolic derangement  Pt still having pain, however declined trying different medications to try to alleviate sx  She has Rx for Tramadol at home      Instructed to f/u with PCP and Cardiology  Return precautions reviewed       Amount and/or Complexity of Data Reviewed  Clinical lab tests: ordered and reviewed  Tests in the radiology section of CPT®: ordered and reviewed  Tests in the medicine section of CPT®: ordered and reviewed  Review and summarize past medical records: yes  Independent visualization of images, tracings, or specimens: yes        Disposition  Final diagnoses:   Chest pain, unspecified   Elevated blood pressure reading     Time reflects when diagnosis was documented in both MDM as applicable and the Disposition within this note     Time User Action Codes Description Comment    7/26/2022 12:21 PM Magdaaristeo QUILES Add [R07 9] Chest pain, unspecified     7/26/2022 12:21 PM Dennise QUILES Add [R03 0] Elevated blood pressure reading       ED Disposition     ED Disposition   Discharge    Condition   Stable    Date/Time   Tue Jul 26, 2022 12:21 PM    Comment   Vinita Claudio discharge to home/self care                 Follow-up Information     Follow up With Specialties Details Why Contact Info Additional [address]   [address], [address] [facility] [id number]  [address], [address], [address] [place], [place], [facility]    [address] [place] [id number]  [address] [facility], [place], [place], [address] [phone]          Discharge Medication List as of 7/26/2022 12:24 PM      CONTINUE these medications which have NOT CHANGED    Details   amLODIPine (NORVASC) 10 mg tablet Take 10 mg by mouth daily, Historical Med      Dapagliflozin-Metformin HCl ER (XIGDUO XR)  MG TB24 Take by mouth, Historical Med      insulin lispro protamine-insulin lispro (HUMALOG MIX 75/25) 100 units/mL inject by subcutaneous route as per insulin sliding scale protocol, Historical Med      losartan (COZAAR) 100 MG tablet Take 100 mg by mouth daily, Historical Med      traMADol (ULTRAM) 50 mg tablet 1-2 tablets by mouth every 6 hours as needed for pain, Print                 PDMP Review     None          ED Provider  Electronically Signed by           Dez Bradley DO  07/26/22 0460

## 2023-07-23 ENCOUNTER — TELEPHONE (OUTPATIENT)
Dept: CARDIOLOGY CLINIC | Facility: CLINIC | Age: 63
End: 2023-07-23